# Patient Record
Sex: FEMALE | Race: BLACK OR AFRICAN AMERICAN | NOT HISPANIC OR LATINO | Employment: FULL TIME | URBAN - METROPOLITAN AREA
[De-identification: names, ages, dates, MRNs, and addresses within clinical notes are randomized per-mention and may not be internally consistent; named-entity substitution may affect disease eponyms.]

---

## 2018-02-09 ENCOUNTER — HOSPITAL ENCOUNTER (EMERGENCY)
Facility: HOSPITAL | Age: 38
Discharge: HOME | End: 2018-02-09
Attending: EMERGENCY MEDICINE
Payer: MEDICAID

## 2018-02-09 VITALS
BODY MASS INDEX: 28 KG/M2 | OXYGEN SATURATION: 97 % | HEART RATE: 93 BPM | SYSTOLIC BLOOD PRESSURE: 115 MMHG | TEMPERATURE: 99 F | DIASTOLIC BLOOD PRESSURE: 69 MMHG | RESPIRATION RATE: 16 BRPM | HEIGHT: 64 IN | WEIGHT: 164 LBS

## 2018-02-09 DIAGNOSIS — R07.9 CHEST PAIN, UNSPECIFIED TYPE: Primary | ICD-10-CM

## 2018-02-09 LAB
ALBUMIN SERPL BCP-MCNC: 3.4 G/DL
ALP SERPL-CCNC: 65 U/L
ALT SERPL W/O P-5'-P-CCNC: 10 U/L
ANION GAP SERPL CALC-SCNC: 10 MMOL/L
AST SERPL-CCNC: 14 U/L
B-HCG UR QL: POSITIVE
BASOPHILS # BLD AUTO: 0.01 K/UL
BASOPHILS NFR BLD: 0.1 %
BILIRUB SERPL-MCNC: 0.2 MG/DL
BNP SERPL-MCNC: 14 PG/ML
BUN SERPL-MCNC: 7 MG/DL
CALCIUM SERPL-MCNC: 9.5 MG/DL
CHLORIDE SERPL-SCNC: 106 MMOL/L
CO2 SERPL-SCNC: 22 MMOL/L
CREAT SERPL-MCNC: 0.8 MG/DL
CTP QC/QA: YES
DIFFERENTIAL METHOD: ABNORMAL
EOSINOPHIL # BLD AUTO: 0.1 K/UL
EOSINOPHIL NFR BLD: 1.1 %
ERYTHROCYTE [DISTWIDTH] IN BLOOD BY AUTOMATED COUNT: 14.3 %
EST. GFR  (AFRICAN AMERICAN): >60 ML/MIN/1.73 M^2
EST. GFR  (NON AFRICAN AMERICAN): >60 ML/MIN/1.73 M^2
GLUCOSE SERPL-MCNC: 89 MG/DL
HCT VFR BLD AUTO: 35.2 %
HGB BLD-MCNC: 11.5 G/DL
INR PPP: 1
LYMPHOCYTES # BLD AUTO: 1.3 K/UL
LYMPHOCYTES NFR BLD: 18.1 %
MCH RBC QN AUTO: 26.2 PG
MCHC RBC AUTO-ENTMCNC: 32.7 G/DL
MCV RBC AUTO: 80 FL
MONOCYTES # BLD AUTO: 0.5 K/UL
MONOCYTES NFR BLD: 6.5 %
NEUTROPHILS # BLD AUTO: 5.4 K/UL
NEUTROPHILS NFR BLD: 73.9 %
PLATELET # BLD AUTO: 315 K/UL
PMV BLD AUTO: 10.1 FL
POTASSIUM SERPL-SCNC: 3.4 MMOL/L
PROT SERPL-MCNC: 8.1 G/DL
PROTHROMBIN TIME: 10 SEC
RBC # BLD AUTO: 4.39 M/UL
SODIUM SERPL-SCNC: 138 MMOL/L
TROPONIN I SERPL DL<=0.01 NG/ML-MCNC: <0.006 NG/ML
WBC # BLD AUTO: 7.35 K/UL

## 2018-02-09 PROCEDURE — 93005 ELECTROCARDIOGRAM TRACING: CPT

## 2018-02-09 PROCEDURE — 83880 ASSAY OF NATRIURETIC PEPTIDE: CPT

## 2018-02-09 PROCEDURE — 25000003 PHARM REV CODE 250: Performed by: NURSE PRACTITIONER

## 2018-02-09 PROCEDURE — 63600175 PHARM REV CODE 636 W HCPCS: Performed by: NURSE PRACTITIONER

## 2018-02-09 PROCEDURE — 85025 COMPLETE CBC W/AUTO DIFF WBC: CPT

## 2018-02-09 PROCEDURE — 81025 URINE PREGNANCY TEST: CPT | Performed by: EMERGENCY MEDICINE

## 2018-02-09 PROCEDURE — 80053 COMPREHEN METABOLIC PANEL: CPT

## 2018-02-09 PROCEDURE — 84484 ASSAY OF TROPONIN QUANT: CPT

## 2018-02-09 PROCEDURE — 85610 PROTHROMBIN TIME: CPT

## 2018-02-09 PROCEDURE — 99284 EMERGENCY DEPT VISIT MOD MDM: CPT | Mod: 25

## 2018-02-09 PROCEDURE — 93010 ELECTROCARDIOGRAM REPORT: CPT | Mod: ,,, | Performed by: INTERNAL MEDICINE

## 2018-02-09 PROCEDURE — 96372 THER/PROPH/DIAG INJ SC/IM: CPT

## 2018-02-09 RX ORDER — NAPROXEN 500 MG/1
500 TABLET ORAL 2 TIMES DAILY WITH MEALS
Qty: 28 TABLET | Refills: 0 | Status: SHIPPED | OUTPATIENT
Start: 2018-02-09 | End: 2018-02-23

## 2018-02-09 RX ORDER — METHOCARBAMOL 500 MG/1
1000 TABLET, FILM COATED ORAL 3 TIMES DAILY
Qty: 20 TABLET | Refills: 0 | Status: SHIPPED | OUTPATIENT
Start: 2018-02-09 | End: 2018-02-14

## 2018-02-09 RX ORDER — HYDROXYZINE PAMOATE 25 MG/1
50 CAPSULE ORAL
Status: COMPLETED | OUTPATIENT
Start: 2018-02-09 | End: 2018-02-09

## 2018-02-09 RX ORDER — KETOROLAC TROMETHAMINE 30 MG/ML
15 INJECTION, SOLUTION INTRAMUSCULAR; INTRAVENOUS
Status: COMPLETED | OUTPATIENT
Start: 2018-02-09 | End: 2018-02-09

## 2018-02-09 RX ADMIN — KETOROLAC TROMETHAMINE 15 MG: 30 INJECTION, SOLUTION INTRAMUSCULAR at 04:02

## 2018-02-09 RX ADMIN — HYDROXYZINE PAMOATE 50 MG: 25 CAPSULE ORAL at 04:02

## 2018-02-09 NOTE — ED TRIAGE NOTES
Pt states that she felt a R chest pain that came from between shoulder blades. Pt states she went to work and the pain became worse. Pt states that the pain is an intermit stabbing pain. Pt states SOB that began today. Pt states she has been taking theraflu x 2 days.

## 2018-02-09 NOTE — DISCHARGE INSTRUCTIONS
Please return to the ED for any new or worsening symptoms: worsening chest pain, shortness of breath, loss of consciousness or any other concerns. Please follow up with primary care within in the week. You may also call [phone] for the [facility] same day appointment line.

## 2018-02-09 NOTE — ED PROVIDER NOTES
"Encounter Date: 2018    SCRIBE #1 NOTE: I, Gabriela Jason, am scribing for, and in the presence of,  YAHIR Fitzgerald. I have scribed the following portions of the note - Other sections scribed: HPI and ROS.       History     Chief Complaint   Patient presents with    Hyperventilating    Chest Pain     mid-sternal CP since waking this AM     CC: Chest Pain    HPI: This 38 y.o female presents to the ED c/o acute, intermittent, 10/10 "striking" right pectoral chest pain radiating to her right upper back that began this morning.  Patient reports the pain is exacerbated with movement and with deep inspiration.  Patient denies fever, shortness of breath, cough, abdominal pain, rhinorrhea, rash, or any other associated symptoms.  Patient reports taking Theraflu 1 week ago after having flu-like symptoms.  Patient reports undergoing termination of pregnancy 1 week ago and reports she is scheduled to follow up next week (5 days from today).  No prior tx.  No alleviating factors.      The history is provided by the patient. No  was used.     Review of patient's allergies indicates:  No Known Allergies  History reviewed. No pertinent past medical history.  Past Surgical History:   Procedure Laterality Date     SECTION       History reviewed. No pertinent family history.  Social History   Substance Use Topics    Smoking status: Never Smoker    Smokeless tobacco: Never Used    Alcohol use No     Review of Systems   Constitutional: Negative for chills and fever.   HENT: Negative for ear pain and sore throat.    Eyes: Negative for pain.   Respiratory: Negative for cough and shortness of breath.    Cardiovascular: Positive for chest pain.   Gastrointestinal: Negative for abdominal pain, diarrhea, nausea and vomiting.   Genitourinary: Negative for dysuria.   Musculoskeletal: Positive for back pain.   Skin: Negative for rash.   Neurological: Negative for weakness, numbness and headaches. "       Physical Exam     Initial Vitals [02/09/18 1359]   BP Pulse Resp Temp SpO2   132/88 (!) 118 (!) 56 98.7 °F (37.1 °C) 99 %      MAP       102.67         Physical Exam    Constitutional: Vital signs are normal. She appears well-developed and well-nourished.  Non-toxic appearance.   Eyes: EOM are normal.   Neck: Full passive range of motion without pain. Neck supple. No neck rigidity.   Cardiovascular: Normal rate, S1 normal, S2 normal and normal heart sounds. Exam reveals no gallop.    No murmur heard.  Pulmonary/Chest: Effort normal and breath sounds normal. No tachypnea. She has no decreased breath sounds. She has no wheezes. She has no rhonchi. She has no rales. She exhibits no tenderness.   Abdominal: Soft. Normal appearance. There is no tenderness. There is no CVA tenderness, no tenderness at McBurney's point and negative Mclaughlin's sign.   Musculoskeletal:        Cervical back: Normal.        Thoracic back: Normal. She exhibits no tenderness and no bony tenderness.   Neurological: She is alert and oriented to person, place, and time. She has normal strength. Gait normal. GCS eye subscore is 4. GCS verbal subscore is 5. GCS motor subscore is 6.   Skin: Skin is warm and dry. No rash noted.   Psychiatric: Her speech is normal. Thought content normal. Her mood appears anxious. She is agitated.         ED Course   Procedures  Labs Reviewed   CBC W/ AUTO DIFFERENTIAL - Abnormal; Notable for the following:        Result Value    Hemoglobin 11.5 (*)     Hematocrit 35.2 (*)     MCV 80 (*)     MCH 26.2 (*)     Gran% 73.9 (*)     All other components within normal limits   COMPREHENSIVE METABOLIC PANEL - Abnormal; Notable for the following:     Potassium 3.4 (*)     CO2 22 (*)     Albumin 3.4 (*)     All other components within normal limits   POCT URINE PREGNANCY - Abnormal; Notable for the following:     POC Preg Test, Ur Positive (*)     All other components within normal limits   B-TYPE NATRIURETIC PEPTIDE    TROPONIN I   PROTIME-INR     EKG Readings: (Independently Interpreted)   Initial Reading: No STEMI. Rhythm: Sinus Tachycardia. Heart Rate: 104. Ectopy: No Ectopy. Conduction: Normal. ST Segments: Normal ST Segments. T Waves: Normal. Clinical Impression: Normal Sinus Rhythm          Medical Decision Making:   ED Management:  This is a 38-year-old nonpregnant female who presents the ED with complaints of chest pain that is worse with deep breathing and movement.  Patient states the [facility] center and causes her to hyperventilate.  She is afebrile and well-appearing.  Her UPT is positive however, patient recently underwent elective .  She denies abdominal pain, vaginal bleeding.  Twelve-lead ECG in triage shows sinus tachycardia.  On my evaluation, her heart rate is normal.  Laboratory evaluation including troponin negative.  Chest x-ray negative.  After receiving Toradol and hydroxyzine, the patient states her pain is resolving.  As her pain is reproducible, I suspect musculoskeletal pain.  No evidence to suggest PE, pneumonia, ACS or other serious etiology.  Discharged home with prescriptions for naproxen and Robaxin.  Instructions given for supportive care and follow-up.  Return precautions given.  Case discussed with Dr. Schroeder, who agrees her plan.            Scribe Attestation:   Scribe #1: I performed the above scribed service and the documentation accurately describes the services I performed. I attest to the accuracy of the note.    Attending Attestation:           Physician Attestation for Scribe:  Physician Attestation Statement for Scribe #1: I, YAHIR Fitzgerald, reviewed documentation, as scribed by Gabriela Jason in my presence, and it is both accurate and complete.                 ED Course      Clinical Impression:   The encounter diagnosis was Chest pain, unspecified type.    Disposition:   Disposition: Discharged  Condition: Stable                        Sumaya Luna  NP  02/09/18 4319

## 2019-02-04 ENCOUNTER — HOSPITAL ENCOUNTER (EMERGENCY)
Facility: OTHER | Age: 39
Discharge: HOME | End: 2019-02-04
Attending: EMERGENCY MEDICINE
Payer: MEDICAID

## 2019-02-04 VITALS
OXYGEN SATURATION: 99 % | WEIGHT: 154 LBS | DIASTOLIC BLOOD PRESSURE: 80 MMHG | BODY MASS INDEX: 26.29 KG/M2 | HEIGHT: 64 IN | SYSTOLIC BLOOD PRESSURE: 133 MMHG | RESPIRATION RATE: 18 BRPM | HEART RATE: 100 BPM | TEMPERATURE: 99 F

## 2019-02-04 DIAGNOSIS — M54.6 ACUTE BILATERAL THORACIC BACK PAIN: Primary | ICD-10-CM

## 2019-02-04 LAB
B-HCG UR QL: NEGATIVE
CTP QC/QA: YES

## 2019-02-04 PROCEDURE — 81025 URINE PREGNANCY TEST: CPT | Performed by: EMERGENCY MEDICINE

## 2019-02-04 PROCEDURE — 99284 EMERGENCY DEPT VISIT MOD MDM: CPT | Mod: 25

## 2019-02-04 PROCEDURE — 25000003 PHARM REV CODE 250: Performed by: EMERGENCY MEDICINE

## 2019-02-04 PROCEDURE — 63600175 PHARM REV CODE 636 W HCPCS: Performed by: EMERGENCY MEDICINE

## 2019-02-04 PROCEDURE — 96372 THER/PROPH/DIAG INJ SC/IM: CPT

## 2019-02-04 RX ORDER — ORPHENADRINE CITRATE 30 MG/ML
30 INJECTION INTRAMUSCULAR; INTRAVENOUS
Status: COMPLETED | OUTPATIENT
Start: 2019-02-04 | End: 2019-02-04

## 2019-02-04 RX ORDER — IBUPROFEN 600 MG/1
600 TABLET ORAL EVERY 6 HOURS PRN
Qty: 20 TABLET | Refills: 0 | OUTPATIENT
Start: 2019-02-04 | End: 2023-03-30

## 2019-02-04 RX ORDER — ORPHENADRINE CITRATE 100 MG/1
100 TABLET, EXTENDED RELEASE ORAL 2 TIMES DAILY
Qty: 20 TABLET | Refills: 0 | Status: SHIPPED | OUTPATIENT
Start: 2019-02-04 | End: 2019-02-14

## 2019-02-04 RX ORDER — KETOROLAC TROMETHAMINE 30 MG/ML
30 INJECTION, SOLUTION INTRAMUSCULAR; INTRAVENOUS
Status: COMPLETED | OUTPATIENT
Start: 2019-02-04 | End: 2019-02-04

## 2019-02-04 RX ORDER — HYDROCODONE BITARTRATE AND ACETAMINOPHEN 5; 325 MG/1; MG/1
2 TABLET ORAL
Status: COMPLETED | OUTPATIENT
Start: 2019-02-04 | End: 2019-02-04

## 2019-02-04 RX ADMIN — ORPHENADRINE CITRATE 30 MG: 30 INJECTION INTRAMUSCULAR; INTRAVENOUS at 08:02

## 2019-02-04 RX ADMIN — KETOROLAC TROMETHAMINE 30 MG: 30 INJECTION, SOLUTION INTRAMUSCULAR; INTRAVENOUS at 08:02

## 2019-02-04 RX ADMIN — HYDROCODONE BITARTRATE AND ACETAMINOPHEN 2 TABLET: 5; 325 TABLET ORAL at 08:02

## 2019-02-05 NOTE — ED NOTES
Appearance: Pt awake, alert & oriented to person, place & time. Pt in no acute distress at present time. Pt is clean and well groomed with clothes appropriately fastened.   Skin: Skin warm, dry & intact. Color consistent with ethnicity. Mucous membranes moist. No breakdown or brusing noted.   Musculoskeletal: Patient moving all extremities well, no obvious swelling or deformities noted. SEE HPI.   Respiratory: Respirations spontaneous, even, and non-labored. Visible chest rise noted. Airway is open and patent. No accessory muscle use noted.   Neurologic: Sensation is intact. Speech is clear and appropriate. Eyes open spontaneously, behavior appropriate to situation, follows commands,  purposeful motor response noted.   Cardiac:  No Bilateral lower extremity edema. Cap refill is <3 seconds. Pt denies active chest pains, SOB, dizziness, blurred vision, weakness or fatigue at this time.   Abdomen: Pt denies active abd pains, cramping or discomfort, No N/V/D at this time.   : Pt reports no dysuria or hematuria.

## 2019-02-05 NOTE — ED PROVIDER NOTES
Encounter Date: 2019       History     Chief Complaint   Patient presents with    Back Pain     Pt CO upper back pain after heavy lifting at work.      39-year-old female presents with a sudden onset of back pain that occurred approximately 2-3 hours prior to arrival.  She describes as aching cramping.  Occurred when she bent over to  a heavy bucket and felt a sudden pain. Worse with movement and better with rest.  Denies any numbness or tingling.  Denies any loss of bowel or bladder.          Review of patient's allergies indicates:  No Known Allergies  History reviewed. No pertinent past medical history.  Past Surgical History:   Procedure Laterality Date     SECTION       History reviewed. No pertinent family history.  Social History     Tobacco Use    Smoking status: Never Smoker    Smokeless tobacco: Never Used   Substance Use Topics    Alcohol use: No    Drug use: No     Review of Systems   Constitutional: Negative for activity change and appetite change.   HENT: Negative for rhinorrhea.    Respiratory: Negative for shortness of breath.    Cardiovascular: Negative for chest pain.   Musculoskeletal: Positive for back pain.       Physical Exam     Initial Vitals [19 1914]   BP Pulse Resp Temp SpO2   133/80 100 18 98.5 °F (36.9 °C) 99 %      MAP       --         Physical Exam    Nursing note and vitals reviewed.  Constitutional: Vital signs are normal. She appears well-developed and well-nourished. She does not have a sickly appearance.   HENT:   Head: Normocephalic and atraumatic.   Eyes: Conjunctivae and EOM are normal.   Neck: Normal range of motion.   Musculoskeletal:   Patient has tenderness to palpation to the bilateral paraspinal muscles of the lower thoracic region.  There is no midline tenderness to palpation, step-offs or deformities of the cervical thoracic or lumbar spine.   Neurological: She is alert and oriented to person, place, and time.   Skin: Skin is warm, dry and  intact. Capillary refill takes less than 2 seconds.         ED Course   Procedures  Labs Reviewed   POCT URINE PREGNANCY          Imaging Results    None          Medical Decision Making:   ED Management:  The patient's back pain is likely a musculoskeletal strain.  There are no signs of saddle anesthesia, incontinence, neurologic deficits, fevers, trauma or midline tenderness on history or physical to suggest cauda equina, infectious process, fracture or subluxation.  I will treat with pain medication, anti-inflammatories and muscle relaxers for relief.    This is the extent to the patients complaints today here in the emergency department.                      Clinical Impression:     1. Acute bilateral thoracic back pain                                  Kip Solano DO  02/04/19 2054

## 2019-02-05 NOTE — ED NOTES
"Pt to ED, reporting posterior thoracic midline back pain with onset x several hours ago. Reporting she was "picking up a bucket of water about to mop and felt intense pain in my back." denies hearing snap or pop with intense pain, denies bowel or bladder incontinence. Ambulates with steady gait. Pt AAOx4 and appropriate at this time. Respirations even and unlabored. No acute distress noted.    "

## 2023-03-29 ENCOUNTER — HOSPITAL ENCOUNTER (EMERGENCY)
Facility: OTHER | Age: 43
Discharge: HOME | End: 2023-03-30
Attending: EMERGENCY MEDICINE
Payer: COMMERCIAL

## 2023-03-29 VITALS
TEMPERATURE: 99 F | OXYGEN SATURATION: 98 % | RESPIRATION RATE: 16 BRPM | HEIGHT: 65 IN | BODY MASS INDEX: 23.32 KG/M2 | DIASTOLIC BLOOD PRESSURE: 89 MMHG | SYSTOLIC BLOOD PRESSURE: 137 MMHG | WEIGHT: 140 LBS | HEART RATE: 93 BPM

## 2023-03-29 DIAGNOSIS — N83.201 RIGHT OVARIAN CYST: ICD-10-CM

## 2023-03-29 DIAGNOSIS — R60.1 GENERALIZED EDEMA: ICD-10-CM

## 2023-03-29 DIAGNOSIS — R10.31 RIGHT LOWER QUADRANT ABDOMINAL PAIN: ICD-10-CM

## 2023-03-29 DIAGNOSIS — N04.9 NEPHROTIC SYNDROME: Primary | ICD-10-CM

## 2023-03-29 LAB
ALBUMIN SERPL BCP-MCNC: 1.1 G/DL (ref 3.5–5.2)
ALP SERPL-CCNC: 50 U/L (ref 55–135)
ALT SERPL W/O P-5'-P-CCNC: 15 U/L (ref 10–44)
ANION GAP SERPL CALC-SCNC: 7 MMOL/L (ref 8–16)
AST SERPL-CCNC: 16 U/L (ref 10–40)
B-HCG UR QL: NEGATIVE
BACTERIA #/AREA URNS HPF: NORMAL /HPF
BASOPHILS # BLD AUTO: 0.03 K/UL (ref 0–0.2)
BASOPHILS NFR BLD: 0.6 % (ref 0–1.9)
BILIRUB SERPL-MCNC: 0.1 MG/DL (ref 0.1–1)
BILIRUB UR QL STRIP: NEGATIVE
BNP SERPL-MCNC: 27 PG/ML (ref 0–99)
BUN SERPL-MCNC: 12 MG/DL (ref 6–20)
CALCIUM SERPL-MCNC: 8.1 MG/DL (ref 8.7–10.5)
CHLORIDE SERPL-SCNC: 111 MMOL/L (ref 95–110)
CLARITY UR: CLEAR
CO2 SERPL-SCNC: 22 MMOL/L (ref 23–29)
COLOR UR: YELLOW
CREAT SERPL-MCNC: 0.6 MG/DL (ref 0.5–1.4)
CTP QC/QA: YES
DIFFERENTIAL METHOD: ABNORMAL
EOSINOPHIL # BLD AUTO: 0.2 K/UL (ref 0–0.5)
EOSINOPHIL NFR BLD: 3.1 % (ref 0–8)
ERYTHROCYTE [DISTWIDTH] IN BLOOD BY AUTOMATED COUNT: 15.1 % (ref 11.5–14.5)
EST. GFR  (NO RACE VARIABLE): >60 ML/MIN/1.73 M^2
GLUCOSE SERPL-MCNC: 83 MG/DL (ref 70–110)
GLUCOSE UR QL STRIP: NEGATIVE
HCT VFR BLD AUTO: 44.1 % (ref 37–48.5)
HGB BLD-MCNC: 13.7 G/DL (ref 12–16)
HGB UR QL STRIP: ABNORMAL
HYALINE CASTS #/AREA URNS LPF: 1 /LPF
IMM GRANULOCYTES # BLD AUTO: 0.01 K/UL (ref 0–0.04)
IMM GRANULOCYTES NFR BLD AUTO: 0.2 % (ref 0–0.5)
KETONES UR QL STRIP: NEGATIVE
LEUKOCYTE ESTERASE UR QL STRIP: NEGATIVE
LIPASE SERPL-CCNC: 42 U/L (ref 4–60)
LYMPHOCYTES # BLD AUTO: 2.2 K/UL (ref 1–4.8)
LYMPHOCYTES NFR BLD: 42.7 % (ref 18–48)
MCH RBC QN AUTO: 25.7 PG (ref 27–31)
MCHC RBC AUTO-ENTMCNC: 31.1 G/DL (ref 32–36)
MCV RBC AUTO: 83 FL (ref 82–98)
MICROSCOPIC COMMENT: NORMAL
MONOCYTES # BLD AUTO: 0.3 K/UL (ref 0.3–1)
MONOCYTES NFR BLD: 5.9 % (ref 4–15)
NEUTROPHILS # BLD AUTO: 2.5 K/UL (ref 1.8–7.7)
NEUTROPHILS NFR BLD: 47.5 % (ref 38–73)
NITRITE UR QL STRIP: NEGATIVE
NRBC BLD-RTO: 0 /100 WBC
PH UR STRIP: 7 [PH] (ref 5–8)
PLATELET # BLD AUTO: 311 K/UL (ref 150–450)
PMV BLD AUTO: 10.7 FL (ref 9.2–12.9)
POTASSIUM SERPL-SCNC: 3.6 MMOL/L (ref 3.5–5.1)
PROT SERPL-MCNC: 5.4 G/DL (ref 6–8.4)
PROT UR QL STRIP: ABNORMAL
RBC # BLD AUTO: 5.33 M/UL (ref 4–5.4)
RBC #/AREA URNS HPF: 4 /HPF (ref 0–4)
SODIUM SERPL-SCNC: 140 MMOL/L (ref 136–145)
SP GR UR STRIP: 1.03 (ref 1–1.03)
SQUAMOUS #/AREA URNS HPF: 15 /HPF
URN SPEC COLLECT METH UR: ABNORMAL
UROBILINOGEN UR STRIP-ACNC: NEGATIVE EU/DL
WBC # BLD AUTO: 5.22 K/UL (ref 3.9–12.7)
WBC #/AREA URNS HPF: 3 /HPF (ref 0–5)

## 2023-03-29 PROCEDURE — 81000 URINALYSIS NONAUTO W/SCOPE: CPT | Performed by: EMERGENCY MEDICINE

## 2023-03-29 PROCEDURE — 81025 URINE PREGNANCY TEST: CPT | Performed by: EMERGENCY MEDICINE

## 2023-03-29 PROCEDURE — 83880 ASSAY OF NATRIURETIC PEPTIDE: CPT | Performed by: EMERGENCY MEDICINE

## 2023-03-29 PROCEDURE — 99284 EMERGENCY DEPT VISIT MOD MDM: CPT | Mod: 25

## 2023-03-29 PROCEDURE — 85025 COMPLETE CBC W/AUTO DIFF WBC: CPT | Performed by: EMERGENCY MEDICINE

## 2023-03-29 PROCEDURE — 80053 COMPREHEN METABOLIC PANEL: CPT | Performed by: EMERGENCY MEDICINE

## 2023-03-29 PROCEDURE — 83690 ASSAY OF LIPASE: CPT | Performed by: EMERGENCY MEDICINE

## 2023-03-30 ENCOUNTER — TELEPHONE (OUTPATIENT)
Dept: EMERGENCY MEDICINE | Facility: OTHER | Age: 43
End: 2023-03-30

## 2023-03-30 DIAGNOSIS — N04.9 NEPHROTIC SYNDROME: Primary | ICD-10-CM

## 2023-03-30 NOTE — PLAN OF CARE
Sw fax referral to [facility] for Patient.    [facility]  [phone]

## 2023-03-30 NOTE — DISCHARGE INSTRUCTIONS
Drink plenty of fluids.  Wear compression stockings.  Do not take any aspirin, ibuprofen, naproxen, or NSAIDs until you are cleared by Nephrology.  A referral to Nephrology was placed,  will call you within the next 2 days to schedule or you can call the clinic.  Return to the ER any time for new or worsening symptoms.

## 2023-03-30 NOTE — ED TRIAGE NOTES
Patient to ED with c/o bilateral feet swelling that started Monday . Also reports skin irritation to Right lower pubic area

## 2023-03-30 NOTE — ED PROVIDER NOTES
Encounter Date: 3/29/2023    SCRIBE #1 NOTE: I, Nga Sandyhens, am scribing for, and in the presence of,  Erum Martin MD.     History     Chief Complaint   Patient presents with    Joint Swelling     Patient to ED with c/o bilateral feet swelling that started Monday . Also reports skin irritation to Right lower pubic area.      This is a 43 y.o. female who presents with complaint of bilateral foot and ankle swelling which she noticed 2 days ago. She states that she does a lot of walking for work, however, the swelling is not her normal baseline. She also reports right sided lower abdomen tenderness onset yesterday, with associated generalized irritation to the right supapubic area. She notes chronic urinary frequency. She denies any abnormal vaginal bleeding or discharge. She denies fever, nausea, vomiting, diarrhea, SOB, or chest pain. This is the extent of the patient's complaints at this time.        The history is provided by the patient.   Review of patient's allergies indicates:  No Known Allergies  History reviewed. No pertinent past medical history.  Past Surgical History:   Procedure Laterality Date     SECTION       History reviewed. No pertinent family history.  Social History     Tobacco Use    Smoking status: Never    Smokeless tobacco: Never   Substance Use Topics    Alcohol use: No    Drug use: No     Review of Systems   Constitutional:  Negative for chills and fever.   HENT:  Negative for congestion and sore throat.    Eyes:  Negative for visual disturbance.   Respiratory:  Negative for cough and shortness of breath.    Cardiovascular:  Positive for leg swelling. Negative for chest pain and palpitations.   Gastrointestinal:  Positive for abdominal pain. Negative for diarrhea and vomiting.   Genitourinary:  Positive for urgency. Negative for decreased urine volume, dysuria, vaginal bleeding and vaginal discharge.   Musculoskeletal:  Negative for neck pain and neck stiffness.   Skin:   Negative for rash and wound.   Neurological:  Negative for weakness, numbness and headaches.   Psychiatric/Behavioral:  Negative for confusion.      Physical Exam     Initial Vitals [03/29/23 1946]   BP Pulse Resp Temp SpO2   137/89 93 16 99 °F (37.2 °C) 98 %      MAP       --         Physical Exam    Constitutional: She appears well-developed and well-nourished. She is cooperative.   HENT:   Head: Normocephalic and atraumatic.   Mouth/Throat: Uvula is midline, oropharynx is clear and moist and mucous membranes are normal. No oropharyngeal exudate.   Eyes: Conjunctivae and EOM are normal. Pupils are equal, round, and reactive to light.   Neck: Neck supple.   Cardiovascular:  Normal rate, regular rhythm and normal heart sounds.           Pulmonary/Chest: Breath sounds normal. No respiratory distress. She has no wheezes. She has no rales.   Abdominal: Abdomen is soft. There is abdominal tenderness.   Lower right quadrant tenderness. There is no rebound and no guarding.   Musculoskeletal:      Cervical back: Neck supple.     Neurological: She is alert and oriented to person, place, and time. She has normal strength. No cranial nerve deficit or sensory deficit. GCS score is 15. GCS eye subscore is 4. GCS verbal subscore is 5. GCS motor subscore is 6.   Skin: Skin is warm and dry. Capillary refill takes less than 2 seconds. No rash noted.   Psychiatric: She has a normal mood and affect. Her speech is normal and behavior is normal. Thought content normal.       ED Course   Procedures  Labs Reviewed   CBC W/ AUTO DIFFERENTIAL - Abnormal; Notable for the following components:       Result Value    MCH 25.7 (*)     MCHC 31.1 (*)     RDW 15.1 (*)     All other components within normal limits   COMPREHENSIVE METABOLIC PANEL - Abnormal; Notable for the following components:    Chloride 111 (*)     CO2 22 (*)     Calcium 8.1 (*)     Total Protein 5.4 (*)     Albumin 1.1 (*)     Alkaline Phosphatase 50 (*)     Anion Gap 7 (*)      All other components within normal limits   URINALYSIS, REFLEX TO URINE CULTURE - Abnormal; Notable for the following components:    Protein, UA 3+ (*)     Occult Blood UA 1+ (*)     All other components within normal limits    Narrative:     Specimen Source->Urine   B-TYPE NATRIURETIC PEPTIDE   LIPASE   URINALYSIS MICROSCOPIC    Narrative:     Specimen Source->Urine   POCT URINE PREGNANCY          Imaging Results               CT Abdomen Pelvis  Without Contrast (Final result)  Result time 03/30/23 00:06:09      Final result by Seth Walsh MD (03/30/23 00:06:09)                   Impression:      Mild accentuation of the body wall fat planes, correlation for general body edema is needed.    Minimal pleural effusion bilaterally, right greater than left, minimal pericardial thickening or fluid noted.    Mild perinephric stranding bilaterally without obstructive uropathy, nonspecific, may relate to a general pattern of body edema however correlation for UTI/pyelonephritis is needed.    Mild accentuated attenuation of the left renal collecting structures, may relate to proteinuria or hematuria, clinical and historical correlation and urinalysis is recommended.    Prominent appearance of the region of the lower uterine segment/cervix for which clinical and historical correlation and evaluation is recommended.    Suspected 2.2 cm right adnexal follicle/cyst.    Additional findings as above.    This report was flagged in Epic as abnormal.      Electronically signed by: Seth Walsh  Date:    03/30/2023  Time:    00:06               Narrative:    EXAMINATION:  CT ABDOMEN PELVIS WITHOUT CONTRAST    CLINICAL HISTORY:  RLQ abdominal pain (Age >= 14y);    TECHNIQUE:  Low dose axial images, sagittal and coronal reformations were obtained from the lung bases to the pubic symphysis.  Intravenous contrast and oral contrast was not utilized, this diminishes the sensitivity of the  examination.    COMPARISON:  None    FINDINGS:  There is appearance of mild accentuated attenuation of the body wall fat planes particularly inferiorly along the lower abdomen and pelvis, this is nonspecific although may relate to general body edema for which clinical and historical correlation is needed.    The lung bases demonstrate mild atelectatic change.  There are bilateral minimal pleural effusions, right greater than left.  Minimal pericardial thickening or fluid noted as well.    The stomach demonstrates nonspecific appearance of mild ingested material and air within the lumen.  When accounting for limitations of the examination, there is no evidence for acute process of the liver, gallbladder, pancreas, spleen, or adrenal glands.    There is mild perinephric haziness bilaterally, this is nonspecific, and may relate to a pattern of general body edema, however correlation for UTI/pyelonephritis is needed.  There is no evidence for ureteral calculus, or obstructive uropathy bilaterally.  An extrarenal pelvis is noted on the left.  There is mild accentuated attenuation of the left extrarenal pelvis, this may relate to proteinuria or hematuria, clinical and historical correlation, correlation with urinalysis is recommended.    The abdominal aorta appears normal in caliber otherwise not well evaluated on this noncontrast examination.  The urinary bladder appears unremarkable for degree of distention.  There is prominent heterogeneous appearance of the region of the lower uterine segment/cervix for which clinical and historical correlation and evaluation is recommended.  Evaluation of the uterus and adnexa is limited.  There does appear to be a 2.2 cm follicle/cyst at the right adnexa measuring 13 Hounsfield units.  There is mild free fluid in the lower pelvis at the cul-de-sac.  This is nonspecific.    There is a small fat-density umbilical hernia without bowel involvement.  There is no evidence for small bowel  obstructive process.  The appendix is identified, it does not appear inflamed.  There is no evidence for inflammatory or obstructive process of the colon.  There is no evidence for free intraperitoneal air.    The visualized osseous structures appear intact.  Mild chronic change noted.                                       Medications - No data to display  Medical Decision Making:   History:   Old Medical Records: I decided to obtain old medical records.  Clinical Tests:   Lab Tests: Ordered and Reviewed  Medical Tests: Ordered and Reviewed  ED Management:  Emergent evaluation a 43-year-old female who presents with complaint of new onset bilateral ankle and foot swelling, also right lower quadrant tenderness x1 day.  Vital signs are benign, afebrile.  On exam there is point tenderness in the right lower quadrant without rebound or guarding.  There is 1+ edema of the foot and ankle, no calf tenderness and I doubt bilateral DVT.  Patient admits that she was at a crawfish boil on Sunday, I was suspicious for salt load causing edema.  However, workup reveals evidence of nephrotic syndrome.  All labs are reviewed by me, she had 3+ proteinuria on urinalysis, albumin level of 1.1, normal BUN and creatinine.  CBC showed no leukocytosis or anemia, BNP is negative for heart failure, lipase is negative for pancreatitis, pregnancy test is negative for pregnancy.  Workup for right lower quadrant pain included CT scan which shows a normal appendix but a right-sided ovarian cyst which is likely cause of her pain.  Incidentally, CT scan also makes note of diffuse body wall edema consistent with anasarca, as well as small bilateral pleural effusions.  I discussed the case with Dr. Giang with Nephrology, patient will need biopsy of her kidney to determine etiology for new onset nephrotic syndrome.  We planned for admission to the observation unit for biopsy in the morning, but patient admits that she took a leave 3 days ago for  tooth pain, and nephrologist states they can not perform a biopsy with NSAID use within the last week.  Will arrange for outpatient workup.  Referral is placed for Nephrology and Internal Medicine, message is sent to Medicaid navigator for assistance with scheduling.  Patient is discharged in good condition and strict return precautions for worsening.        Scribe Attestation:   Scribe #1: I performed the above scribed service and the documentation accurately describes the services I performed. I attest to the accuracy of the note.            Physician Attestation for Scribe: I, Erum Martin, reviewed documentation as scribed in my presence, which is both accurate and complete.         Clinical Impression:   Final diagnoses:  [N04.9] Nephrotic syndrome (Primary)  [R60.1] Generalized edema  [R10.31] Right lower quadrant abdominal pain  [N83.201] Right ovarian cyst        ED Disposition Condition    Discharge Stable          ED Prescriptions    None       Follow-up Information       Follow up With Specialties Details Why Contact Info    Your regular primary care doctor  Schedule an appointment as soon as possible for a visit  For symptom recheck and close follow-up     Amrita Giang MD Nephrology  or the nephrologist of your choice [facility] [facility] [address]  [facility] [address]  [phone]      [facility] - Emergency Dept Emergency Medicine  As needed, If symptoms worsen [address] [facility] [address] [phone]             Erum Martin MD  03/30/23 0138

## 2023-03-30 NOTE — ED TRIAGE NOTES
Lilia Vaca, a 43 y.o. female presents to the ED w/ complaint of generalized swelling of body and tightness to legs.    Triage note:  Chief Complaint   Patient presents with    Joint Swelling     Patient to ED with c/o bilateral feet swelling that started Monday . Also reports skin irritation to Right lower pubic area.      Review of patient's allergies indicates:  No Known Allergies  History reviewed. No pertinent past medical history.

## 2023-04-06 ENCOUNTER — HOSPITAL ENCOUNTER (OUTPATIENT)
Facility: OTHER | Age: 43
Discharge: HOME | End: 2023-04-07
Attending: EMERGENCY MEDICINE | Admitting: EMERGENCY MEDICINE
Payer: COMMERCIAL

## 2023-04-06 DIAGNOSIS — N04.9 NEPHROTIC SYNDROME: Primary | ICD-10-CM

## 2023-04-06 PROCEDURE — 81025 URINE PREGNANCY TEST: CPT | Performed by: EMERGENCY MEDICINE

## 2023-04-06 NOTE — Clinical Note
Diagnosis: Nephrotic syndrome [561732]   Future Attending Provider: JENNIFER OWUSU [1945]   Is the patient being sent to ED Observation?: Yes   Admitting Provider:: JENNIFER OWUSU [[id number]]   Special Needs:: No Special Needs [1]

## 2023-04-07 VITALS
OXYGEN SATURATION: 99 % | RESPIRATION RATE: 17 BRPM | BODY MASS INDEX: 29.43 KG/M2 | DIASTOLIC BLOOD PRESSURE: 82 MMHG | TEMPERATURE: 98 F | HEART RATE: 98 BPM | SYSTOLIC BLOOD PRESSURE: 114 MMHG | HEIGHT: 64 IN | WEIGHT: 172.38 LBS

## 2023-04-07 DIAGNOSIS — N04.9 NEPHROTIC SYNDROME: Primary | ICD-10-CM

## 2023-04-07 LAB
25(OH)D3+25(OH)D2 SERPL-MCNC: <4 NG/ML (ref 30–96)
ALBUMIN SERPL BCP-MCNC: 0.7 G/DL (ref 3.5–5.2)
ALP SERPL-CCNC: 53 U/L (ref 55–135)
ALT SERPL W/O P-5'-P-CCNC: 9 U/L (ref 10–44)
ANION GAP SERPL CALC-SCNC: 8 MMOL/L (ref 8–16)
AST SERPL-CCNC: 13 U/L (ref 10–40)
B-HCG UR QL: NEGATIVE
BACTERIA #/AREA URNS HPF: ABNORMAL /HPF
BASOPHILS # BLD AUTO: 0.03 K/UL (ref 0–0.2)
BASOPHILS NFR BLD: 0.4 % (ref 0–1.9)
BILIRUB SERPL-MCNC: 0.1 MG/DL (ref 0.1–1)
BILIRUB UR QL STRIP: NEGATIVE
BILIRUB UR QL STRIP: NEGATIVE
BNP SERPL-MCNC: 21 PG/ML (ref 0–99)
BUN SERPL-MCNC: 11 MG/DL (ref 6–20)
C3 SERPL-MCNC: 138 MG/DL (ref 50–180)
C4 SERPL-MCNC: 31 MG/DL (ref 11–44)
CALCIUM SERPL-MCNC: 7.7 MG/DL (ref 8.7–10.5)
CHLORIDE SERPL-SCNC: 108 MMOL/L (ref 95–110)
CHOLEST SERPL-MCNC: 459 MG/DL (ref 120–199)
CHOLEST/HDLC SERPL: 6.6 {RATIO} (ref 2–5)
CLARITY UR: ABNORMAL
CLARITY UR: ABNORMAL
CO2 SERPL-SCNC: 20 MMOL/L (ref 23–29)
COLOR UR: YELLOW
COLOR UR: YELLOW
CREAT SERPL-MCNC: 0.8 MG/DL (ref 0.5–1.4)
CREAT UR-MCNC: 337.1 MG/DL (ref 15–325)
CTP QC/QA: YES
DIFFERENTIAL METHOD: ABNORMAL
EOSINOPHIL # BLD AUTO: 0.1 K/UL (ref 0–0.5)
EOSINOPHIL NFR BLD: 1.8 % (ref 0–8)
ERYTHROCYTE [DISTWIDTH] IN BLOOD BY AUTOMATED COUNT: 14.9 % (ref 11.5–14.5)
EST. GFR  (NO RACE VARIABLE): >60 ML/MIN/1.73 M^2
GLUCOSE SERPL-MCNC: 88 MG/DL (ref 70–110)
GLUCOSE UR QL STRIP: ABNORMAL
GLUCOSE UR QL STRIP: ABNORMAL
HAV IGM SERPL QL IA: NORMAL
HBV CORE IGM SERPL QL IA: NORMAL
HBV SURFACE AG SERPL QL IA: NORMAL
HCT VFR BLD AUTO: 42.8 % (ref 37–48.5)
HCV AB SERPL QL IA: NEGATIVE
HCV AB SERPL QL IA: NORMAL
HDLC SERPL-MCNC: 70 MG/DL (ref 40–75)
HDLC SERPL: 15.3 % (ref 20–50)
HGB BLD-MCNC: 13.4 G/DL (ref 12–16)
HGB UR QL STRIP: ABNORMAL
HGB UR QL STRIP: ABNORMAL
HIV 1+2 AB+HIV1 P24 AG SERPL QL IA: NEGATIVE
HYALINE CASTS #/AREA URNS LPF: 28 /LPF
IMM GRANULOCYTES # BLD AUTO: 0.01 K/UL (ref 0–0.04)
IMM GRANULOCYTES NFR BLD AUTO: 0.1 % (ref 0–0.5)
KETONES UR QL STRIP: NEGATIVE
KETONES UR QL STRIP: NEGATIVE
LDLC SERPL CALC-MCNC: 359.8 MG/DL (ref 63–159)
LEUKOCYTE ESTERASE UR QL STRIP: NEGATIVE
LEUKOCYTE ESTERASE UR QL STRIP: NEGATIVE
LYMPHOCYTES # BLD AUTO: 2 K/UL (ref 1–4.8)
LYMPHOCYTES NFR BLD: 29.3 % (ref 18–48)
MCH RBC QN AUTO: 26.2 PG (ref 27–31)
MCHC RBC AUTO-ENTMCNC: 31.3 G/DL (ref 32–36)
MCV RBC AUTO: 84 FL (ref 82–98)
MICROSCOPIC COMMENT: ABNORMAL
MONOCYTES # BLD AUTO: 0.4 K/UL (ref 0.3–1)
MONOCYTES NFR BLD: 6.3 % (ref 4–15)
NEUTROPHILS # BLD AUTO: 4.2 K/UL (ref 1.8–7.7)
NEUTROPHILS NFR BLD: 62.1 % (ref 38–73)
NITRITE UR QL STRIP: NEGATIVE
NITRITE UR QL STRIP: NEGATIVE
NON-SQ EPI CELLS #/AREA URNS HPF: 2 /HPF
NONHDLC SERPL-MCNC: 389 MG/DL
NRBC BLD-RTO: 0 /100 WBC
PH UR STRIP: 7 [PH] (ref 5–8)
PH UR STRIP: 7 [PH] (ref 5–8)
PLATELET # BLD AUTO: 354 K/UL (ref 150–450)
PMV BLD AUTO: 10.8 FL (ref 9.2–12.9)
POTASSIUM SERPL-SCNC: 3.5 MMOL/L (ref 3.5–5.1)
PROT SERPL-MCNC: 5.1 G/DL (ref 6–8.4)
PROT UR QL STRIP: ABNORMAL
PROT UR QL STRIP: ABNORMAL
PROT UR-MCNC: >1500 MG/DL (ref 0–15)
PROT/CREAT UR: ABNORMAL MG/G{CREAT} (ref 0–0.2)
PTH-INTACT SERPL-MCNC: 119.4 PG/ML (ref 9–77)
RBC # BLD AUTO: 5.12 M/UL (ref 4–5.4)
RBC #/AREA URNS HPF: 10 /HPF (ref 0–4)
RHEUMATOID FACT SERPL-ACNC: <13 IU/ML (ref 0–15)
RPR SER QL: NORMAL
SODIUM SERPL-SCNC: 136 MMOL/L (ref 136–145)
SP GR UR STRIP: >1.03 (ref 1–1.03)
SP GR UR STRIP: >1.03 (ref 1–1.03)
SQUAMOUS #/AREA URNS HPF: 22 /HPF
T4 FREE SERPL-MCNC: 0.6 NG/DL (ref 0.71–1.51)
TRIGL SERPL-MCNC: 146 MG/DL (ref 30–150)
TROPONIN I SERPL DL<=0.01 NG/ML-MCNC: <0.006 NG/ML (ref 0–0.03)
TSH SERPL DL<=0.005 MIU/L-ACNC: 4.81 UIU/ML (ref 0.4–4)
URATE SERPL-MCNC: 4.2 MG/DL (ref 2.4–5.7)
URN SPEC COLLECT METH UR: ABNORMAL
URN SPEC COLLECT METH UR: ABNORMAL
UROBILINOGEN UR STRIP-ACNC: NEGATIVE EU/DL
UROBILINOGEN UR STRIP-ACNC: NEGATIVE EU/DL
WBC # BLD AUTO: 6.83 K/UL (ref 3.9–12.7)
WBC #/AREA URNS HPF: 15 /HPF (ref 0–5)

## 2023-04-07 PROCEDURE — 82172 ASSAY OF APOLIPOPROTEIN: CPT | Performed by: NURSE PRACTITIONER

## 2023-04-07 PROCEDURE — 96360 HYDRATION IV INFUSION INIT: CPT

## 2023-04-07 PROCEDURE — 84165 PROTEIN E-PHORESIS SERUM: CPT | Mod: 26,,, | Performed by: PATHOLOGY

## 2023-04-07 PROCEDURE — G0378 HOSPITAL OBSERVATION PER HR: HCPCS

## 2023-04-07 PROCEDURE — 86255 FLUORESCENT ANTIBODY SCREEN: CPT | Performed by: NURSE PRACTITIONER

## 2023-04-07 PROCEDURE — 86334 PATHOLOGIST INTERPRETATION IFE: ICD-10-PCS | Mod: 26,,, | Performed by: PATHOLOGY

## 2023-04-07 PROCEDURE — 84165 PATHOLOGIST INTERPRETATION SPE: ICD-10-PCS | Mod: 26,,, | Performed by: PATHOLOGY

## 2023-04-07 PROCEDURE — 86334 IMMUNOFIX E-PHORESIS SERUM: CPT | Performed by: NURSE PRACTITIONER

## 2023-04-07 PROCEDURE — 85025 COMPLETE CBC W/AUTO DIFF WBC: CPT | Performed by: PHYSICIAN ASSISTANT

## 2023-04-07 PROCEDURE — 84550 ASSAY OF BLOOD/URIC ACID: CPT | Performed by: NURSE PRACTITIONER

## 2023-04-07 PROCEDURE — 84156 ASSAY OF PROTEIN URINE: CPT | Mod: 91 | Performed by: NURSE PRACTITIONER

## 2023-04-07 PROCEDURE — 86038 ANTINUCLEAR ANTIBODIES: CPT | Performed by: NURSE PRACTITIONER

## 2023-04-07 PROCEDURE — 86160 COMPLEMENT ANTIGEN: CPT | Mod: 59 | Performed by: NURSE PRACTITIONER

## 2023-04-07 PROCEDURE — 86431 RHEUMATOID FACTOR QUANT: CPT | Performed by: NURSE PRACTITIONER

## 2023-04-07 PROCEDURE — 84156 ASSAY OF PROTEIN URINE: CPT | Performed by: NURSE PRACTITIONER

## 2023-04-07 PROCEDURE — 83970 ASSAY OF PARATHORMONE: CPT | Performed by: NURSE PRACTITIONER

## 2023-04-07 PROCEDURE — 83521 IG LIGHT CHAINS FREE EACH: CPT | Mod: 59 | Performed by: NURSE PRACTITIONER

## 2023-04-07 PROCEDURE — 86803 HEPATITIS C AB TEST: CPT | Performed by: EMERGENCY MEDICINE

## 2023-04-07 PROCEDURE — 25000003 PHARM REV CODE 250: Performed by: EMERGENCY MEDICINE

## 2023-04-07 PROCEDURE — 82306 VITAMIN D 25 HYDROXY: CPT | Performed by: NURSE PRACTITIONER

## 2023-04-07 PROCEDURE — 86060 ANTISTREPTOLYSIN O TITER: CPT | Performed by: NURSE PRACTITIONER

## 2023-04-07 PROCEDURE — 83520 IMMUNOASSAY QUANT NOS NONAB: CPT | Performed by: NURSE PRACTITIONER

## 2023-04-07 PROCEDURE — 81000 URINALYSIS NONAUTO W/SCOPE: CPT | Performed by: PHYSICIAN ASSISTANT

## 2023-04-07 PROCEDURE — 86334 IMMUNOFIX E-PHORESIS SERUM: CPT | Mod: 26,,, | Performed by: PATHOLOGY

## 2023-04-07 PROCEDURE — 80074 ACUTE HEPATITIS PANEL: CPT | Performed by: NURSE PRACTITIONER

## 2023-04-07 PROCEDURE — 84484 ASSAY OF TROPONIN QUANT: CPT | Performed by: PHYSICIAN ASSISTANT

## 2023-04-07 PROCEDURE — 86160 COMPLEMENT ANTIGEN: CPT | Performed by: NURSE PRACTITIONER

## 2023-04-07 PROCEDURE — 83880 ASSAY OF NATRIURETIC PEPTIDE: CPT | Performed by: PHYSICIAN ASSISTANT

## 2023-04-07 PROCEDURE — 84439 ASSAY OF FREE THYROXINE: CPT | Performed by: EMERGENCY MEDICINE

## 2023-04-07 PROCEDURE — 25000003 PHARM REV CODE 250: Performed by: NURSE PRACTITIONER

## 2023-04-07 PROCEDURE — 86592 SYPHILIS TEST NON-TREP QUAL: CPT | Performed by: NURSE PRACTITIONER

## 2023-04-07 PROCEDURE — 83520 IMMUNOASSAY QUANT NOS NONAB: CPT | Mod: 59 | Performed by: NURSE PRACTITIONER

## 2023-04-07 PROCEDURE — 80061 LIPID PANEL: CPT | Performed by: NURSE PRACTITIONER

## 2023-04-07 PROCEDURE — 87086 URINE CULTURE/COLONY COUNT: CPT | Performed by: PHYSICIAN ASSISTANT

## 2023-04-07 PROCEDURE — 86036 ANCA SCREEN EACH ANTIBODY: CPT | Mod: 59 | Performed by: NURSE PRACTITIONER

## 2023-04-07 PROCEDURE — 87389 HIV-1 AG W/HIV-1&-2 AB AG IA: CPT | Performed by: EMERGENCY MEDICINE

## 2023-04-07 PROCEDURE — 96361 HYDRATE IV INFUSION ADD-ON: CPT

## 2023-04-07 PROCEDURE — 86255 FLUORESCENT ANTIBODY SCREEN: CPT | Mod: 59 | Performed by: NURSE PRACTITIONER

## 2023-04-07 PROCEDURE — 36415 COLL VENOUS BLD VENIPUNCTURE: CPT | Performed by: NURSE PRACTITIONER

## 2023-04-07 PROCEDURE — 94761 N-INVAS EAR/PLS OXIMETRY MLT: CPT

## 2023-04-07 PROCEDURE — 99285 EMERGENCY DEPT VISIT HI MDM: CPT

## 2023-04-07 PROCEDURE — 84443 ASSAY THYROID STIM HORMONE: CPT | Performed by: EMERGENCY MEDICINE

## 2023-04-07 PROCEDURE — 80053 COMPREHEN METABOLIC PANEL: CPT | Performed by: PHYSICIAN ASSISTANT

## 2023-04-07 PROCEDURE — 84165 PROTEIN E-PHORESIS SERUM: CPT | Performed by: NURSE PRACTITIONER

## 2023-04-07 RX ORDER — ATORVASTATIN CALCIUM 10 MG/1
10 TABLET, FILM COATED ORAL DAILY
Qty: 30 TABLET | Refills: 0 | Status: SHIPPED | OUTPATIENT
Start: 2023-04-07 | End: 2023-04-14 | Stop reason: ALTCHOICE

## 2023-04-07 RX ORDER — ATORVASTATIN CALCIUM 10 MG/1
10 TABLET, FILM COATED ORAL NIGHTLY
Status: DISCONTINUED | OUTPATIENT
Start: 2023-04-07 | End: 2023-04-07 | Stop reason: HOSPADM

## 2023-04-07 RX ORDER — MORPHINE SULFATE 2 MG/ML
2 INJECTION, SOLUTION INTRAMUSCULAR; INTRAVENOUS EVERY 4 HOURS PRN
Status: DISCONTINUED | OUTPATIENT
Start: 2023-04-07 | End: 2023-04-07 | Stop reason: HOSPADM

## 2023-04-07 RX ORDER — TALC
6 POWDER (GRAM) TOPICAL NIGHTLY PRN
Status: DISCONTINUED | OUTPATIENT
Start: 2023-04-07 | End: 2023-04-07 | Stop reason: HOSPADM

## 2023-04-07 RX ORDER — LEVOTHYROXINE SODIUM 25 UG/1
25 TABLET ORAL
Status: DISCONTINUED | OUTPATIENT
Start: 2023-04-08 | End: 2023-04-07

## 2023-04-07 RX ORDER — SODIUM CHLORIDE 0.9 % (FLUSH) 0.9 %
10 SYRINGE (ML) INJECTION
Status: DISCONTINUED | OUTPATIENT
Start: 2023-04-07 | End: 2023-04-07 | Stop reason: HOSPADM

## 2023-04-07 RX ORDER — LEVOTHYROXINE SODIUM 25 UG/1
25 TABLET ORAL
Qty: 30 TABLET | Refills: 0 | Status: SHIPPED | OUTPATIENT
Start: 2023-04-07 | End: 2023-11-02

## 2023-04-07 RX ORDER — ONDANSETRON 2 MG/ML
4 INJECTION INTRAMUSCULAR; INTRAVENOUS EVERY 8 HOURS PRN
Status: DISCONTINUED | OUTPATIENT
Start: 2023-04-07 | End: 2023-04-07 | Stop reason: HOSPADM

## 2023-04-07 RX ORDER — SODIUM CHLORIDE 9 MG/ML
INJECTION, SOLUTION INTRAVENOUS CONTINUOUS
Status: DISCONTINUED | OUTPATIENT
Start: 2023-04-07 | End: 2023-04-07

## 2023-04-07 RX ORDER — LEVOTHYROXINE SODIUM 25 UG/1
25 TABLET ORAL
Status: DISCONTINUED | OUTPATIENT
Start: 2023-04-07 | End: 2023-04-07 | Stop reason: HOSPADM

## 2023-04-07 RX ADMIN — LEVOTHYROXINE SODIUM 25 MCG: 25 TABLET ORAL at 08:04

## 2023-04-07 RX ADMIN — SODIUM CHLORIDE: 9 INJECTION, SOLUTION INTRAVENOUS at 02:04

## 2023-04-07 NOTE — ED NOTES
Resumed pt care. 43 YOF presents to ED with c/o BLE swelling. Pending Nephrology Consult. +2 BLE edemas noted. +1 bilateral pedal pulses noted. Denies any PMH. A&Ox4. Denies any other complaints.     LOC: The patient is awake, alert and aware of environment with an appropriate affect, the patient is oriented x 3.  APPEARANCE: Patient resting comfortably and in no acute distress, patient is clean and well groomed, patient's clothing is properly fastened.  SKIN: The skin is warm and dry, patient has normal skin turgor and moist mucus membranes, skin intact, no breakdown or brusing noted.  MUSKULOSKELETAL: Patient moving all extremities well, no obvious swelling or deformities noted.  RESPIRATORY: Airway is open and patent, respirations are spontaneous, patient has a normal effort and rate.  CARDIAC: No peripheral edema.  ABDOMEN: Soft and no tenderness to palpation, no distention noted.     ED workup in progress. VSS. Safety measures in place; side rails up x2. Call light within pt reach. Will continue to monitor.

## 2023-04-07 NOTE — CONSULTS
"Consult Note  Nephrology    Consult Requested By: No att. providers found  Reason for Consult: Nephrotic Syn    SUBJECTIVE:     History of Present Illness:  Patient is a 43 y.o. female presents with progressive lower extremity swelling over last few weeks.  Presented to [facility] Emergency room about a week ago with same complaints and was directed to follow-up with [facility] as an outpatient for kidney biopsy and evaluation as she was currently taking NSAIDs.  Presented to [facility] emergency room and left without being seen yesterday.  Came here for another evaluation and admitted for observation.  No quantified urine protein but epic documentation reveals 3+ over multiple encounters and hypoalbuminemia.    Consulted for assistance.  Patient seen and examined.  For reasons unknown to me she was on normal saline at 125 cc an hour.  No pertinent medical history and only claims that her father had some type of kidney disease.  Acknowledges foamy urine for "a while at home".  3 para 3.  Does not take any medicines except for occasional NSAIDs for generalized pains.  Admits to high salt intake.  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  Explained plan of care including urine and laboratory testing.  Unable to perform biopsy on good Friday and will try to arrange next week with follow-up.  Patient and team agreed to this plan.  See below.    Epic reviewed.    No CP/SOB/N/V/D/F/C.    History reviewed. No pertinent past medical history.  Past Surgical History:   Procedure Laterality Date     SECTION       History reviewed. No pertinent family history.  Social History     Tobacco Use    Smoking status: Never    Smokeless tobacco: Never   Substance Use Topics    Alcohol use: No    Drug use: No       Review of patient's allergies indicates:  No Known Allergies     Review of Systems:  Constitutional: No fever or chills  Respiratory: No cough or shortness of breath  Cardiovascular: No chest pain or " palpitations  Gastrointestinal: No nausea or vomiting  Neurological: No confusion or weakness    OBJECTIVE:     Vital Signs (Most Recent)  Temp: 98 °F (36.7 °C) (04/07/23 0815)  Pulse: 90 (04/07/23 0815)  Resp: 18 (04/07/23 0815)  BP: 126/80 (04/07/23 0815)  SpO2: 98 % (04/07/23 0815)    Vital Signs Range (Last 24H):  Temp:  [98 °F (36.7 °C)-98.7 °F (37.1 °C)]   Pulse:  []   Resp:  [16-20]   BP: (114-136)/(73-84)   SpO2:  [97 %-100 %]     No intake or output data in the 24 hours ending 04/07/23 0817    Physical Exam:  General appearance: Well developed, well nourished  Eyes:  Conjunctivae/corneas clear. PERRL.  Lungs: Normal respiratory effort,   clear to auscultation bilaterally   Heart: Regular rate and rhythm, S1, S2 normal, no murmur, rub or alli.  Abdomen: Soft, non-tender non-distended; bowel sounds normal; no masses,  no organomegaly  Extremities: No cyanosis or clubbing. 2+ edema.    Skin: Skin color, texture, turgor normal. No rashes or lesions  Neurologic: Normal strength and tone. No focal numbness or weakness       Laboratory:  Recent Labs   Lab 04/07/23  0002   WBC 6.83   RBC 5.12   HGB 13.4   HCT 42.8      MCV 84   MCH 26.2*   MCHC 31.3*     BMP:   Recent Labs   Lab 04/07/23  0002   GLU 88      K 3.5      CO2 20*   BUN 11   CREATININE 0.8   CALCIUM 7.7*     Lab Results   Component Value Date    CALCIUM 7.7 (L) 04/07/2023     BNP  Recent Labs   Lab 04/07/23  0002   BNP 21   No results found for: URICACIDNo results found for: IRON, TIBC, FERRITIN, SATURATEDIRO  Lab Results   Component Value Date    CALCIUM 7.7 (L) 04/07/2023       Diagnostic Results:  No orders to display       ASSESSMENT/PLAN:     Suspected nephrotic syndrome with 3+ proteinuria and severe hypoalbuminemia:  -no significant medical or family history  -will obtain urine and nephrotic workup  -unable to perform biopsy on holiday and will try to arrange as outpatient next week with follow-up  -Needs statin, ASA  (after biopsy), antiprotenuric meds.    -DC IVF's, advised against NSAIDs.   -ok to DC after labs obtained.   -no HTN  -Mild hypothyroidism and would start low dose synthroid  -corrected Calcium 10.3 so would not correct or give Vit D yet.   -discussed low salt diet.   -see orders.  -Renally dose meds, avoid nephrotoxins, and monitor I/O's closely.        Thanks for consult  See above  DC after labs drawn.  I have arranged outpt Biopsy for next week.

## 2023-04-07 NOTE — FIRST PROVIDER EVALUATION
Emergency Department TeleTriage Encounter Note      CHIEF COMPLAINT    Chief Complaint   Patient presents with    Leg Swelling     Bilateral feet, lower legs and ankle swelling for over a week. Was seen here a week ago for the same thing and swelling has not gotten any better       VITAL SIGNS   Initial Vitals [04/06/23 2205]   BP Pulse Resp Temp SpO2   128/78 98 20 98.5 °F (36.9 °C) 99 %      MAP       --            ALLERGIES    Review of patient's allergies indicates:  No Known Allergies    PROVIDER TRIAGE NOTE  43-year-old female presents with lower extremity edema. Seen 1 week ago with the same complaint and diagnosed with nephrotic syndrome. Vitals are normal. No distress noted.       ORDERS  Labs Reviewed   HIV 1 / 2 ANTIBODY   HEPATITIS C ANTIBODY       ED Orders (720h ago, onward)      Start Ordered     Status Ordering Provider    04/06/23 2209 04/06/23 2208  HIV 1/2 Ag/Ab (4th Gen)  STAT         Ordered LEXX CEE    04/06/23 2209 04/06/23 2208  Hepatitis C Antibody  STAT         Ordered LEXX CEE    Unscheduled 04/06/23 2232  Saline lock IV  Once         Ordered DEVON PENA    Unscheduled 04/06/23 2232  CBC Auto Differential  STAT         Ordered DEVON PENA    Unscheduled 04/06/23 2232  Comprehensive Metabolic Panel  STAT         Ordered DEVON PENA    Unscheduled 04/06/23 2232  BNP  STAT         Ordered DEVON PENA    Unscheduled 04/06/23 2232  Urinalysis, Reflex to Urine Culture  STAT         Ordered DEVON PENA    Unscheduled 04/06/23 2232  Troponin I  STAT         Ordered DEVON PENA              Virtual Visit Note: The provider triage portion of this emergency department evaluation and documentation was performed via [employer], a HIPAA-compliant telemedicine application, in concert with a tele-presenter in the room. A face to face patient evaluation with one of my colleagues will occur once the patient is placed in an emergency department  room.      DISCLAIMER: This note was prepared with [employer] voice recognition transcription software. Garbled syntax, mangled pronouns, and other bizarre constructions may be attributed to that software system.

## 2023-04-07 NOTE — ED TRIAGE NOTES
Pt reports BLE edema x1 week, [facility] for same complaint. Pt states the BLE has not improved since last visit. Pt noted to have 2+ edema to BLE, 2+ DP bilaterally. Pt AAOx4, VSS, NADN. Call light next to pt, bed in lowest/locked position, side rails up x2, pt free from fall/injury. Labs pending, pt aware urine sample is needed. Pt instructed to ask for assistance, verbalizes understanding. Will continue pt's plan of care while pt remains in the ER.

## 2023-04-07 NOTE — CARE UPDATE
04/07/23 0400   Patient Assessment/Suction   Level of Consciousness (AVPU) alert   Respiratory Effort Normal;Unlabored   All Lung Fields Breath Sounds equal bilaterally;clear   Rhythm/Pattern, Respiratory no shortness of breath reported   PRE-TX-O2   Device (Oxygen Therapy) room air   SpO2 98 %   Pulse Oximetry Type Intermittent   $ Pulse Oximetry - Multiple Charge Pulse Oximetry - Multiple   Pulse 91   Resp 18

## 2023-04-07 NOTE — DISCHARGE SUMMARY
[facility] ED Observation Unit  Discharge Summary        History of Present Illness:    Lilia Vaca is a 43 y.o. female presenting with bilateral lower extremity and abdominal swelling worsening for the past several days. Patient was seen in this ED with similar complaints of abdomina pain and swelling on 3/29/23 and discharged with instructions to return should her symptoms arise again. She denies any SOB.       ED Course:  Labs largely unchanged from 1 week ago.  Was unable to establish follow-up from previous ER visit.  Was admitted to the EDOU for for allergy consult in the morning     Observation Course:    Patient was evaluated by Nephrology who recommended patient is starting on a statin and Synthroid.  Nephrology clinical reach out to schedule patient for her biopsy.    Brief Physical Exam/Reassessment   Review of Systems   Constitutional:  Negative for chills and fever.   Respiratory:  Negative for shortness of breath.    Cardiovascular:  Positive for leg swelling. Negative for chest pain.   Gastrointestinal:  Negative for abdominal pain, nausea and vomiting.        Abdominal swelling   Neurological:  Negative for dizziness, weakness and headaches.   All other systems reviewed and are negative.    Physical Exam    Nursing note and vitals reviewed.  Constitutional: Vital signs are normal. She appears well-developed and well-nourished. She does not appear ill. No distress.   Neck: Neck supple.   Cardiovascular:  Normal rate, regular rhythm, S1 normal, S2 normal and normal heart sounds.           Pulses:       Dorsalis pedis pulses are 2+ on the right side and 2+ on the left side.   Pulmonary/Chest: Effort normal and breath sounds normal. No tachypnea. She has no decreased breath sounds.   Abdominal: Abdomen is flat. Bowel sounds are normal.   Lower abdominal swelling.   Musculoskeletal:      Cervical back: Neck supple.      Comments: Plus two pitting edema     Neurological: She is alert and oriented to  person, place, and time. GCS eye subscore is 4. GCS verbal subscore is 5. GCS motor subscore is 6.   Skin: Skin is warm, dry and intact.   Psychiatric: She has a normal mood and affect. Her behavior is normal.       Consultants:    Nephrology    ED/OBS Workup:  Vitals:    04/07/23 0217 04/07/23 0400 04/07/23 0611 04/07/23 0815   BP: 136/76  114/79 126/80   Pulse: 93 91 93 90   Resp: 18 18 16 18   Temp: 98.5 °F (36.9 °C)  98.7 °F (37.1 °C) 98 °F (36.7 °C)   TempSrc:   Oral Oral   SpO2: 97% 98% 97% 98%   Weight:       Height:        04/07/23 0957   BP: 114/82   Pulse: 98   Resp: 17   Temp: 98 °F (36.7 °C)   TempSrc: Oral   SpO2: 99%   Weight:    Height:        Labs Reviewed   CBC W/ AUTO DIFFERENTIAL - Abnormal; Notable for the following components:       Result Value    MCH 26.2 (*)     MCHC 31.3 (*)     RDW 14.9 (*)     All other components within normal limits   COMPREHENSIVE METABOLIC PANEL - Abnormal; Notable for the following components:    CO2 20 (*)     Calcium 7.7 (*)     Total Protein 5.1 (*)     Albumin 0.7 (*)     Alkaline Phosphatase 53 (*)     ALT 9 (*)     All other components within normal limits   URINALYSIS, REFLEX TO URINE CULTURE - Abnormal; Notable for the following components:    Appearance, UA Hazy (*)     Specific Gravity, UA >1.030 (*)     Protein, UA 3+ (*)     Glucose, UA Trace (*)     Occult Blood UA 3+ (*)     All other components within normal limits    Narrative:     Specimen Source->Urine  Collection Start Date->4/7/23  Collection Start Time-> 9:45 AM  Collection Stop Time-> 9:46 AM    24hr urine and UPE received in error 4-7-2023, at time of UAR & UPRCR   random urine submission.  24hr urine and UPE order to be kept open   until 24hr collection is submitted in lab. [facility]   URINALYSIS, REFLEX TO URINE CULTURE - Abnormal; Notable for the following components:    Appearance, UA Hazy (*)     Specific Gravity, UA >1.030 (*)     Protein, UA 3+ (*)     Glucose, UA Trace (*)     Occult Blood UA  3+ (*)     All other components within normal limits    Narrative:     Specimen Source->Urine  Collection Start Date->4/7/23  Collection Start Time-> 9:45 AM  Collection Stop Time-> 9:46 AM    24hr urine and UPE received in error 4-7-2023, at time of UAR & UPRCR   random urine submission.  24hr urine and UPE order to be kept open   until 24hr collection is submitted in lab. [facility]   TSH - Abnormal; Notable for the following components:    TSH 4.807 (*)     All other components within normal limits   T4, FREE - Abnormal; Notable for the following components:    Free T4 0.60 (*)     All other components within normal limits   PTH, INTACT - Abnormal; Notable for the following components:    PTH, Intact 119.4 (*)     All other components within normal limits   PROTEIN / CREATININE RATIO, URINE - Abnormal; Notable for the following components:    Creatinine, Urine 337.1 (*)     All other components within normal limits    Narrative:     Specimen Source->Urine  Collection Start Date->4/7/23  Collection Start Time-> 9:45 AM  Collection Stop Time-> 9:46 AM    24hr urine and UPE received in error 4-7-2023, at time of UAR & UPRCR   random urine submission.  24hr urine and UPE order to be kept open   until 24hr collection is submitted in lab. [facility]   URINALYSIS MICROSCOPIC - Abnormal; Notable for the following components:    RBC, UA 10 (*)     WBC, UA 15 (*)     Bacteria Moderate (*)     Non-Squam Epith 2 (*)     Hyaline Casts, UA 28 (*)     All other components within normal limits    Narrative:     Specimen Source->Urine  Collection Start Date->4/7/23  Collection Start Time-> 9:45 AM  Collection Stop Time-> 9:46 AM    24hr urine and UPE received in error 4-7-2023, at time of UAR & UPRCR   random urine submission.  24hr urine and UPE order to be kept open   until 24hr collection is submitted in lab. [facility]   POCT URINE PREGNANCY - Normal   CULTURE, URINE   HIV 1 / 2 ANTIBODY    Narrative:     Release to patient->Immediate   HEPATITIS  C ANTIBODY    Narrative:     Release to patient->Immediate   B-TYPE NATRIURETIC PEPTIDE   TROPONIN I   URIC ACID   URIC ACID   PROTEIN, URINE, TIMED    Narrative:     Specimen Source->Urine  Collection Start Date->4/7/23  Collection Start Time-> 9:45 AM  Collection Stop Time-> 9:46 AM    24hr urine and UPE received in error 4-7-2023, at time of UAR & UPRCR   random urine submission.  24hr urine and UPE order to be kept open   until 24hr collection is submitted in lab. [facility]   PÉREZ PROFILE I (SCREEN) W/ REFLEX   ANTI-NEUTROPHILIC CYTOPLASMIC ANTIBODY   RPR   STREPTOCOCCAL ANTIBODIES (ASO)   C3 COMPLEMENT   C4 COMPLEMENT   PROTEIN ELECTROPHORESIS, SERUM   HEPATITIS PANEL, ACUTE   IMMUNOGLOBULIN FREE LT CHAINS BLOOD   GLOMERULAR BASEMENT MEMBRANE ANTIBODIES   APOLIPOPROTEIN A1 AND B   PHOSPHOLIPASE A2 RECEPTOR AB, SERUM   VITAMIN D   LIPID PANEL   RHEUMATOID FACTOR   PROTEIN ELECTROPHORESIS, TIMED URINE       No orders to display       Final Diagnosis:  1. Nephrotic syndrome        Plan:  Discharge home with close follow-up with Nephrology, the clinical reach out to schedule patient for her biopsy.  Will start patient on statins and Synthroid.  Strict return precautions given to the patient including worsening of symptoms, shortness breath, chest pain, fever she is return to emerge department for re-evaluation.  She stated understanding.    Discharge Condition: Good    Disposition: Home or Self Care     Time spent on the discharge of the patient including review of hospital course with the patient. reviewing discharge medications and arranging follow-up care 35 minutes.  Patient was seen and examined on the date of discharge and determined to be suitable for discharge.    Follow Up:   ED Disposition Condition    Discharge Stable            ED Prescriptions       Medication Sig Dispense Start Date End Date Auth. Provider    atorvastatin (LIPITOR) 10 MG tablet Take 1 tablet (10 mg total) by mouth once daily. 30 tablet  4/7/2023 5/7/2023 JOSE LUIS Balbuena    levothyroxine (SYNTHROID) 25 MCG tablet Take 1 tablet (25 mcg total) by mouth before breakfast. 30 tablet 4/7/2023 5/7/2023 JOSE LUIS Balbuena          Follow-up Information       Follow up With Specialties Details Why Contact Info    [facility] - Emergency Dept Emergency Medicine Go to  If symptoms worsen [address] [facility] [id number] [phone]    Clinton Hull NP Nephrology Call   [address] [address]  [phone]              Future Appointments   Date Time Provider Department Center   4/14/2023  9:40 AM Odalis Hamilton MD [facility]   4/25/2023  1:15 PM Nayana Aiken MD [facility]

## 2023-04-07 NOTE — ED NOTES
Pt awake in bed at this time, TALITA. Call light next to pt, bed in lowest/locked position, side rails up x2, pt free from fall/injury. Pt instructed to ask for assistance, verbalizes understanding. Will continue pt's plan of care while pt remains in the ER.

## 2023-04-07 NOTE — H&P
[facility] ED Observation Unit  History and Physical      I assumed care of this patient from the Emergency Department at onset of my shift at 9:00 a.m. on 2023.       History of Present Illness:  Lilia Vaca is a 43 y.o. female presenting with bilateral lower extremity and abdominal swelling worsening for the past several days. Patient was seen in this ED with similar complaints of abdomina pain and swelling on 3/29/23 and discharged with instructions to return should her symptoms arise again. She denies any SOB.      ED Course:  Labs largely unchanged from 1 week ago.  Was unable to establish follow-up from previous ER visit.  Was admitted to the EDOU for for allergy consult in the morning    I reviewed the ED Provider Note dated 2023 prior to my evaluation of this patient.  I reviewed all labs and imaging performed in the Main ED, prior to patient being placed in Observation. Patient was placed in the ED Observation Unit for nephrotic syndrome.    PMHx   History reviewed. No pertinent past medical history.   Past Surgical History:   Procedure Laterality Date     SECTION          Family Hx   History reviewed. No pertinent family history.     Social Hx   Social History     Socioeconomic History    Marital status: Single   Tobacco Use    Smoking status: Never    Smokeless tobacco: Never   Substance and Sexual Activity    Alcohol use: No    Drug use: No    Sexual activity: Yes     Partners: Male        Vital Signs   Vitals:    23 0217 23 0400 23 0611 23 0815   BP: 136/76  114/79 126/80   Pulse: 93 91 93 90   Resp: 18 18 16 18   Temp: 98.5 °F (36.9 °C)  98.7 °F (37.1 °C) 98 °F (36.7 °C)   TempSrc:   Oral Oral   SpO2: 97% 98% 97% 98%   Weight:       Height:        23 0957   BP: 114/82   Pulse: 98   Resp: 17   Temp: 98 °F (36.7 °C)   TempSrc: Oral   SpO2: 99%   Weight:    Height:        Review of Systems  Review of Systems   Constitutional:  Negative for chills,  fever and malaise/fatigue.   Respiratory:  Negative for shortness of breath.    Cardiovascular:  Positive for leg swelling. Negative for chest pain.   Gastrointestinal:  Negative for abdominal pain, nausea and vomiting.        Abdominal swelling   Skin:  Negative for rash.   Neurological:  Negative for dizziness and headaches.   All other systems reviewed and are negative.    Brief Physical Exam/Reassessment   Physical Exam    Nursing note and vitals reviewed.  Constitutional: Vital signs are normal. She appears well-developed and well-nourished. She does not appear ill. No distress.   Neck: Neck supple.   Cardiovascular:  Normal rate, regular rhythm, S1 normal, S2 normal and normal heart sounds.           Pulmonary/Chest: Effort normal and breath sounds normal. No tachypnea. She has no decreased breath sounds. She has no wheezes.   Abdominal: Abdomen is soft and flat. There is no abdominal tenderness.   Lower abdominal swelling   Musculoskeletal:      Cervical back: Neck supple.      Comments: Plus one pitting edema from lower abdomen to ankles     Neurological: She is alert and oriented to person, place, and time. GCS eye subscore is 4. GCS verbal subscore is 5. GCS motor subscore is 6.   Skin: Skin is warm, dry and intact.   Psychiatric: She has a normal mood and affect. Her behavior is normal.       Labs Reviewed   CBC W/ AUTO DIFFERENTIAL - Abnormal; Notable for the following components:       Result Value    MCH 26.2 (*)     MCHC 31.3 (*)     RDW 14.9 (*)     All other components within normal limits   COMPREHENSIVE METABOLIC PANEL - Abnormal; Notable for the following components:    CO2 20 (*)     Calcium 7.7 (*)     Total Protein 5.1 (*)     Albumin 0.7 (*)     Alkaline Phosphatase 53 (*)     ALT 9 (*)     All other components within normal limits   URINALYSIS, REFLEX TO URINE CULTURE - Abnormal; Notable for the following components:    Appearance, UA Hazy (*)     Specific Gravity, UA >1.030 (*)     Protein,  UA 3+ (*)     Glucose, UA Trace (*)     Occult Blood UA 3+ (*)     All other components within normal limits    Narrative:     Specimen Source->Urine  Collection Start Date->4/7/23  Collection Start Time-> 9:45 AM  Collection Stop Time-> 9:46 AM    24hr urine and UPE received in error 4-7-2023, at time of UAR & UPRCR   random urine submission.  24hr urine and UPE order to be kept open   until 24hr collection is submitted in lab. [facility]   URINALYSIS, REFLEX TO URINE CULTURE - Abnormal; Notable for the following components:    Appearance, UA Hazy (*)     Specific Gravity, UA >1.030 (*)     Protein, UA 3+ (*)     Glucose, UA Trace (*)     Occult Blood UA 3+ (*)     All other components within normal limits    Narrative:     Specimen Source->Urine  Collection Start Date->4/7/23  Collection Start Time-> 9:45 AM  Collection Stop Time-> 9:46 AM    24hr urine and UPE received in error 4-7-2023, at time of UAR & UPRCR   random urine submission.  24hr urine and UPE order to be kept open   until 24hr collection is submitted in lab. [facility]   TSH - Abnormal; Notable for the following components:    TSH 4.807 (*)     All other components within normal limits   T4, FREE - Abnormal; Notable for the following components:    Free T4 0.60 (*)     All other components within normal limits   PTH, INTACT - Abnormal; Notable for the following components:    PTH, Intact 119.4 (*)     All other components within normal limits   POCT URINE PREGNANCY - Normal   HIV 1 / 2 ANTIBODY    Narrative:     Release to patient->Immediate   HEPATITIS C ANTIBODY    Narrative:     Release to patient->Immediate   B-TYPE NATRIURETIC PEPTIDE   TROPONIN I   URIC ACID   URIC ACID   PROTEIN, URINE, TIMED    Narrative:     Specimen Source->Urine  Collection Start Date->4/7/23  Collection Start Time-> 9:45 AM  Collection Stop Time-> 9:46 AM    24hr urine and UPE received in error 4-7-2023, at time of UAR & UPRCR   random urine submission.  24hr urine and UPE order to be  kept open   until 24hr collection is submitted in lab. [facility]   URINALYSIS MICROSCOPIC    Narrative:     Specimen Source->Urine  Collection Start Date->4/7/23  Collection Start Time-> 9:45 AM  Collection Stop Time-> 9:46 AM    24hr urine and UPE received in error 4-7-2023, at time of UAR & UPRCR   random urine submission.  24hr urine and UPE order to be kept open   until 24hr collection is submitted in lab. [facility]   PÉREZ PROFILE I (SCREEN) W/ REFLEX   ANTI-NEUTROPHILIC CYTOPLASMIC ANTIBODY   RPR   STREPTOCOCCAL ANTIBODIES (ASO)   C3 COMPLEMENT   C4 COMPLEMENT   PROTEIN ELECTROPHORESIS, SERUM   HEPATITIS PANEL, ACUTE   IMMUNOGLOBULIN FREE LT CHAINS BLOOD   GLOMERULAR BASEMENT MEMBRANE ANTIBODIES   APOLIPOPROTEIN A1 AND B   PHOSPHOLIPASE A2 RECEPTOR AB, SERUM   VITAMIN D   PROTEIN / CREATININE RATIO, URINE   LIPID PANEL   RHEUMATOID FACTOR   PROTEIN ELECTROPHORESIS, TIMED URINE     No orders to display         Medications:   Scheduled Meds:   atorvastatin  10 mg Oral QHS    levothyroxine  25 mcg Oral Before breakfast     Continuous Infusions:  PRN Meds:.melatonin, morphine, ondansetron, sodium chloride 0.9%      Assessment/Plan:    1. Nephrotic syndrome      - nephrology consult       Case was discussed with the ED provider, Kip Solano MD

## 2023-04-07 NOTE — ED PROVIDER NOTES
"SCRIBE #1 NOTE: I, Jose Moncada, am scribing for, and in the presence of,  Kip Solano DO. I have scribed the following portions of the note - Other sections scribed: HPI, ROS, PE.       Source of History:  Patient    Chief complaint:  Leg Swelling (Bilateral feet, lower legs and ankle swelling for over a week. Was seen here a week ago for the same thing and swelling has not gotten any better)      HPI:  Lilia Vaca is a 43 y.o. female presenting with bilateral lower extremity and abdominal swelling worsening for the past several days. Patient was seen in this ED with similar complaints of abdomina pain and swelling on 3/29/23 and discharged with instructions to return should her symptoms arise again. She denies any SOB.     This is the extent to the patients complaints today here in the emergency department.    ROS:   See HPI.    Review of patient's allergies indicates:  No Known Allergies    PMH:  As per HPI and below:  History reviewed. No pertinent past medical history.  Past Surgical History:   Procedure Laterality Date     SECTION         Social History     Tobacco Use    Smoking status: Never    Smokeless tobacco: Never   Substance Use Topics    Alcohol use: No    Drug use: No       Physical Exam:    /82 (BP Location: Left arm, Patient Position: Sitting)   Pulse 98   Temp 98 °F (36.7 °C) (Oral)   Resp 17   Ht 5' 4" (1.626 m)   Wt 78.2 kg (172 lb 6.4 oz)   LMP 2023   SpO2 99%   Breastfeeding No   BMI 29.59 kg/m²   Nursing note and vital signs reviewed.  Constitutional: No acute distress.  Nontoxic  Cardiovascular: Tachycardic rate.  Regular rhythm.  No murmurs. No gallops. No rubs  Respiratory: Clear to auscultation bilaterally.  Good air movement.  No wheezes.  No rhonchi. No rales. No accessory muscle use..  Abdomen: Soft.  Not distended.  Nontender.  No guarding.  No rebound. Non-peritoneal.  Musculoskeletal: Pitting edema of bilateral lower extremities through to " her abdomen. Good range of motion all joints.  No deformities.  Neck supple.  No meningismus.    Summary of Previous Medical Records:  03/29/2023 senior in the emergency department for similar complaint of leg swelling.  Workup showed 3+ protein as well as CT scan and concern of nephrotic syndrome.  He was discussed with the nephrology at that time to observe the patient and bring in for renal biopsy, however the patient had recently had aspirin and was unable to do the biopsy.  Was discharged to follow up as an outpatient.    MDM/ Differential Dx:    43-year-old female with leg swelling no improvement interval worsening from 1 week ago.  Reviewing her notes it appears that there was discussed about admitting for observation for renal biopsy but was unable to do aspirin.  The patient has not taken any aspirin.  Will get repeat labs to look for any other electrolyte abnormalities or kidney abnormalities.  Will admit to the ED observation unit for Nephrology consultation tomorrow and possible biopsy.    Social Concerns:  Due to the patient's Medicaid patient likely will have difficulty of any reasonable time frame to follow-up with nephrology and get outpatient procedures done.    ED Course as of 04/10/23 1220   Fri Apr 07, 2023   0044 Troponin I: <0.006 [SM]   0044 Sodium: 136 [SM]   0044 Potassium: 3.5 [SM]   0045 CMP is largely unchanged from 1 week ago. [SM]   0045 WBC: 6.83 [SM]   0045 Hemoglobin: 13.4 [SM]   0045 Platelets: 354 [SM]   0133 TSH(!): 4.807 [SM]   0133 Free T4(!): 0.60 [SM]   0859 Patient evaluated by Nephrology, has developed a plan for her and recommends discharge. [MA]      ED Course User Index  [MA] Milo Stanley MD  [SM] Kip Solano, DO                Scribe Attestation:   Scribe #1: I performed the above scribed service and the documentation accurately describes the services I performed. I attest to the accuracy of the note.  I, Dr Kip Solano, personally performed the services  described in this documentation. All medical record entries made by the scribe were at my direction and in my presence. I have reviewed the chart and agree that the record reflects my personal performance and is accurate and complete.   Diagnostic Impression:    1. Nephrotic syndrome         ED Disposition Condition    Discharge Stable            ED Prescriptions       Medication Sig Dispense Start Date End Date Auth. Provider    atorvastatin (LIPITOR) 10 MG tablet Take 1 tablet (10 mg total) by mouth once daily. 30 tablet 4/7/2023 5/7/2023 JOSE LUIS Balbuena    levothyroxine (SYNTHROID) 25 MCG tablet Take 1 tablet (25 mcg total) by mouth before breakfast. 30 tablet 4/7/2023 5/7/2023 JOSE LUIS Balbuena          Follow-up Information       Follow up With Specialties Details Why Contact Info    [facility] - Emergency Dept Emergency Medicine Go to  If symptoms worsen [address] [facility] [address]  [phone]    Clinton Hull NP Nephrology Call   [address] [address]  [phone]               Kip Solano, DO  04/07/23 0136       Kip Solano, DO  04/10/23 1220

## 2023-04-08 LAB
BACTERIA UR CULT: NORMAL
BACTERIA UR CULT: NORMAL

## 2023-04-10 LAB
ALBUMIN SERPL ELPH-MCNC: 1.28 G/DL (ref 3.35–5.55)
ALPHA1 GLOB SERPL ELPH-MCNC: 0.17 G/DL (ref 0.17–0.41)
ALPHA2 GLOB SERPL ELPH-MCNC: 1.23 G/DL (ref 0.43–0.99)
ANA SER QL IF: NORMAL
B-GLOBULIN SERPL ELPH-MCNC: 0.67 G/DL (ref 0.5–1.1)
GAMMA GLOB SERPL ELPH-MCNC: 0.45 G/DL (ref 0.67–1.58)
KAPPA LC SER QL IA: 7.67 MG/DL (ref 0.33–1.94)
KAPPA LC/LAMBDA SER IA: 2.02 (ref 0.26–1.65)
LAMBDA LC SER QL IA: 3.8 MG/DL (ref 0.57–2.63)
PROT SERPL-MCNC: 3.8 G/DL (ref 6–8.4)

## 2023-04-11 LAB
BM IGG SER-ACNC: <0.2 U
PATHOLOGIST INTERPRETATION SPE: NORMAL

## 2023-04-12 LAB
ANCA AB TITR SER IF: NORMAL TITER
ASO AB SERPL-ACNC: 26 IU/ML
INTERPRETATION SERPL IFE-IMP: NORMAL
P-ANCA TITR SER IF: NORMAL TITER
PATHOLOGIST INTERPRETATION IFE: NORMAL

## 2023-04-14 ENCOUNTER — TELEPHONE (OUTPATIENT)
Dept: GENETICS | Facility: CLINIC | Age: 43
End: 2023-04-14
Payer: COMMERCIAL

## 2023-04-14 ENCOUNTER — OFFICE VISIT (OUTPATIENT)
Dept: PRIMARY CARE CLINIC | Facility: CLINIC | Age: 43
End: 2023-04-14
Payer: COMMERCIAL

## 2023-04-14 VITALS
HEART RATE: 90 BPM | HEIGHT: 64 IN | DIASTOLIC BLOOD PRESSURE: 81 MMHG | BODY MASS INDEX: 30.51 KG/M2 | SYSTOLIC BLOOD PRESSURE: 127 MMHG | WEIGHT: 178.69 LBS | RESPIRATION RATE: 16 BRPM

## 2023-04-14 DIAGNOSIS — R93.89 ABNORMAL FINDING PRESENT ON DIAGNOSTIC IMAGING OF UTERUS: ICD-10-CM

## 2023-04-14 DIAGNOSIS — R07.89 ATYPICAL CHEST PAIN: ICD-10-CM

## 2023-04-14 DIAGNOSIS — R79.89 HIGH SERUM PARATHYROID HORMONE (PTH): ICD-10-CM

## 2023-04-14 DIAGNOSIS — Z12.31 ENCOUNTER FOR SCREENING MAMMOGRAM FOR BREAST CANCER: ICD-10-CM

## 2023-04-14 DIAGNOSIS — N04.9 NEPHROTIC SYNDROME: Primary | ICD-10-CM

## 2023-04-14 DIAGNOSIS — N94.9 ADNEXAL CYST: ICD-10-CM

## 2023-04-14 DIAGNOSIS — E78.00 PURE HYPERCHOLESTEROLEMIA: ICD-10-CM

## 2023-04-14 DIAGNOSIS — E03.9 ACQUIRED HYPOTHYROIDISM: ICD-10-CM

## 2023-04-14 LAB
PHOSPHOLIPASE A2 RECEPTOR, ELISA: <2 RU/ML
PLA2R IGG SERPL QL IF: NEGATIVE

## 2023-04-14 PROCEDURE — 93010 ELECTROCARDIOGRAM REPORT: CPT | Mod: S$GLB,,, | Performed by: INTERNAL MEDICINE

## 2023-04-14 PROCEDURE — 93005 EKG 12-LEAD: ICD-10-PCS | Mod: S$GLB,,, | Performed by: FAMILY MEDICINE

## 2023-04-14 PROCEDURE — 1160F PR REVIEW ALL MEDS BY PRESCRIBER/CLIN PHARMACIST DOCUMENTED: ICD-10-PCS | Mod: CPTII,S$GLB,, | Performed by: FAMILY MEDICINE

## 2023-04-14 PROCEDURE — 93005 ELECTROCARDIOGRAM TRACING: CPT | Mod: S$GLB,,, | Performed by: FAMILY MEDICINE

## 2023-04-14 PROCEDURE — 1160F RVW MEDS BY RX/DR IN RCRD: CPT | Mod: CPTII,S$GLB,, | Performed by: FAMILY MEDICINE

## 2023-04-14 PROCEDURE — 3008F BODY MASS INDEX DOCD: CPT | Mod: CPTII,S$GLB,, | Performed by: FAMILY MEDICINE

## 2023-04-14 PROCEDURE — 3074F PR MOST RECENT SYSTOLIC BLOOD PRESSURE < 130 MM HG: ICD-10-PCS | Mod: CPTII,S$GLB,, | Performed by: FAMILY MEDICINE

## 2023-04-14 PROCEDURE — 93010 EKG 12-LEAD: ICD-10-PCS | Mod: S$GLB,,, | Performed by: INTERNAL MEDICINE

## 2023-04-14 PROCEDURE — 3079F PR MOST RECENT DIASTOLIC BLOOD PRESSURE 80-89 MM HG: ICD-10-PCS | Mod: CPTII,S$GLB,, | Performed by: FAMILY MEDICINE

## 2023-04-14 PROCEDURE — 99205 PR OFFICE/OUTPT VISIT, NEW, LEVL V, 60-74 MIN: ICD-10-PCS | Mod: S$GLB,,, | Performed by: FAMILY MEDICINE

## 2023-04-14 PROCEDURE — 99999 PR PBB SHADOW E&M-EST. PATIENT-LVL V: CPT | Mod: PBBFAC,,, | Performed by: FAMILY MEDICINE

## 2023-04-14 PROCEDURE — 99999 PR PBB SHADOW E&M-EST. PATIENT-LVL V: ICD-10-PCS | Mod: PBBFAC,,, | Performed by: FAMILY MEDICINE

## 2023-04-14 PROCEDURE — 1159F PR MEDICATION LIST DOCUMENTED IN MEDICAL RECORD: ICD-10-PCS | Mod: CPTII,S$GLB,, | Performed by: FAMILY MEDICINE

## 2023-04-14 PROCEDURE — 99205 OFFICE O/P NEW HI 60 MIN: CPT | Mod: S$GLB,,, | Performed by: FAMILY MEDICINE

## 2023-04-14 PROCEDURE — 3074F SYST BP LT 130 MM HG: CPT | Mod: CPTII,S$GLB,, | Performed by: FAMILY MEDICINE

## 2023-04-14 PROCEDURE — 3079F DIAST BP 80-89 MM HG: CPT | Mod: CPTII,S$GLB,, | Performed by: FAMILY MEDICINE

## 2023-04-14 PROCEDURE — 1159F MED LIST DOCD IN RCRD: CPT | Mod: CPTII,S$GLB,, | Performed by: FAMILY MEDICINE

## 2023-04-14 PROCEDURE — 3008F PR BODY MASS INDEX (BMI) DOCUMENTED: ICD-10-PCS | Mod: CPTII,S$GLB,, | Performed by: FAMILY MEDICINE

## 2023-04-14 RX ORDER — OMEPRAZOLE 40 MG/1
40 CAPSULE, DELAYED RELEASE ORAL DAILY
Qty: 30 CAPSULE | Refills: 2 | Status: SHIPPED | OUTPATIENT
Start: 2023-04-14 | End: 2023-07-17

## 2023-04-14 RX ORDER — FUROSEMIDE 20 MG/1
20 TABLET ORAL
COMMUNITY
Start: 2023-04-13 | End: 2023-11-02

## 2023-04-14 RX ORDER — ROSUVASTATIN CALCIUM 20 MG/1
20 TABLET, COATED ORAL DAILY
Qty: 90 TABLET | Refills: 1 | Status: SHIPPED | OUTPATIENT
Start: 2023-04-14 | End: 2023-11-06

## 2023-04-14 NOTE — PROGRESS NOTES
Subjective     Patient ID: Lilia Vaca is a 43 y.o. female.    Chief Complaint: Foot Swelling    HPI  Review of Systems   Constitutional:  Negative for fatigue.   Cardiovascular:  Positive for leg swelling.   Gastrointestinal:         Indigestion        Objective     Physical Exam       Assessment and Plan     Problem List Items Addressed This Visit    None  Visit Diagnoses       Nephrotic syndrome    -  Primary    Acquired hypothyroidism        High serum parathyroid hormone (PTH)        Encounter for screening mammogram for breast cancer        Relevant Orders    Mammo Digital Screening Bilat    Abnormal finding present on diagnostic imaging of uterus        Relevant Orders    Ambulatory referral/consult to Gynecology    Adnexal cyst        Relevant Orders    Ambulatory referral/consult to Gynecology    Pure hypercholesterolemia        Relevant Medications    rosuvastatin (CRESTOR) 20 MG tablet    Other Relevant Orders    Ambulatory referral/consult to Genetics            ***

## 2023-04-14 NOTE — TELEPHONE ENCOUNTER
Called pt to let them know that Dr. Masseys is no longer with [facility] and unfortunately Dr. Ortiz will also be moving out of state at the end of June, therefore she won't be seeing any new patients beyond May. Thus, we do not have any genetics appointments available to offer. Provided pt with phone numbers for other geneticists in the area. [facility] ([phone]), [facility] ([phone]), or [facility] in [place] ([phone]).  was unavailable.     ----- Message from Tanya Barrett sent at 4/14/2023  3:53 PM CDT -----  Please reach out to patient to schedule appt

## 2023-04-15 LAB
APO A-I SERPL-MCNC: NORMAL
APO B SERPL-MCNC: NORMAL MG/DL
APOLIPOPROTEIN B/A1 RATIO: NORMAL

## 2023-04-16 NOTE — PROGRESS NOTES
Subjective     Patient ID: Liila Vaca is a 43 y.o. female.    Chief Complaint: Foot Swelling    HPI:  Patient is a 43-year-old female here for follow-up recent ER visits over the past few weeks for evaluation of lower extremity edema, which led to a diagnosis of nephrotic syndrome.  Last pertinent for hypoproteinemia with increased percentages of cancer and lambda free light chains.  PTH was also noted to be elevated at 119.  Patient was prescribed Lasix which she began on today.  Patient does not have a known history of hypertension for family history of kidney disease.  She does have severely elevated hyper cholesterolemia according to recent labs.  She does admit to consuming a lot of seafood recently.  Patient also reports sensation of indigestion in the substernal region which occurs at night.  She denies abdominal pain or heartburn.  Patient had a recent CT scan of the abdomen and pelvic revealed mild perinephric stranding bilaterally in attenuation of the left renal collecting structures.  Incidental finding of prominent appearance of the lower uterine segment/cervix and right adnexal follicle cysts.    Review of Systems   Constitutional:  Negative for unexpected weight change.   Respiratory:  Negative for shortness of breath.    Cardiovascular:  Positive for leg swelling. Negative for chest pain.   Gastrointestinal:  Negative for abdominal pain, blood in stool, change in bowel habit, constipation, diarrhea, nausea and change in bowel habit.        Indigestion   All other systems reviewed and are negative.       Objective     Physical Exam  Constitutional:       Appearance: Normal appearance. She is well-developed.   HENT:      Head: Atraumatic.   Neck:      Thyroid: No thyromegaly.   Cardiovascular:      Rate and Rhythm: Normal rate and regular rhythm.      Heart sounds: Normal heart sounds. No murmur heard.  Pulmonary:      Effort: Pulmonary effort is normal. No respiratory distress.      Breath  sounds: Normal breath sounds. No wheezing or rales.   Abdominal:      General: Bowel sounds are normal. There is no distension.      Palpations: Abdomen is soft. There is no mass.      Tenderness: There is no abdominal tenderness.   Musculoskeletal:      Cervical back: Normal range of motion and neck supple.      Right lower leg: Edema (4+ edema bilat) present.      Left lower leg: Edema present.   Lymphadenopathy:      Cervical: No cervical adenopathy.   Skin:     General: Skin is warm and dry.      Findings: No rash.   Neurological:      Mental Status: She is alert and oriented to person, place, and time.     Results for orders placed or performed during the hospital encounter of 04/06/23   Urine culture    Specimen: Urine   Result Value Ref Range    Urine Culture, Routine       Multiple organisms isolated. None in predominance.  Repeat if    Urine Culture, Routine clinically necessary.    HIV 1/2 Ag/Ab (4th Gen)   Result Value Ref Range    HIV 1/2 Ag/Ab Negative Negative   Hepatitis C Antibody   Result Value Ref Range    Hepatitis C Ab Negative Negative   CBC Auto Differential   Result Value Ref Range    WBC 6.83 3.90 - 12.70 K/uL    RBC 5.12 4.00 - 5.40 M/uL    Hemoglobin 13.4 12.0 - 16.0 g/dL    Hematocrit 42.8 37.0 - 48.5 %    MCV 84 82 - 98 fL    MCH 26.2 (L) 27.0 - 31.0 pg    MCHC 31.3 (L) 32.0 - 36.0 g/dL    RDW 14.9 (H) 11.5 - 14.5 %    Platelets 354 150 - 450 K/uL    MPV 10.8 9.2 - 12.9 fL    Immature Granulocytes 0.1 0.0 - 0.5 %    Gran # (ANC) 4.2 1.8 - 7.7 K/uL    Immature Grans (Abs) 0.01 0.00 - 0.04 K/uL    Lymph # 2.0 1.0 - 4.8 K/uL    Mono # 0.4 0.3 - 1.0 K/uL    Eos # 0.1 0.0 - 0.5 K/uL    Baso # 0.03 0.00 - 0.20 K/uL    nRBC 0 0 /100 WBC    Gran % 62.1 38.0 - 73.0 %    Lymph % 29.3 18.0 - 48.0 %    Mono % 6.3 4.0 - 15.0 %    Eosinophil % 1.8 0.0 - 8.0 %    Basophil % 0.4 0.0 - 1.9 %    Differential Method Automated    Comprehensive Metabolic Panel   Result Value Ref Range    Sodium 136 136 - 145  mmol/L    Potassium 3.5 3.5 - 5.1 mmol/L    Chloride 108 95 - 110 mmol/L    CO2 20 (L) 23 - 29 mmol/L    Glucose 88 70 - 110 mg/dL    BUN 11 6 - 20 mg/dL    Creatinine 0.8 0.5 - 1.4 mg/dL    Calcium 7.7 (L) 8.7 - 10.5 mg/dL    Total Protein 5.1 (L) 6.0 - 8.4 g/dL    Albumin 0.7 (L) 3.5 - 5.2 g/dL    Total Bilirubin 0.1 0.1 - 1.0 mg/dL    Alkaline Phosphatase 53 (L) 55 - 135 U/L    AST 13 10 - 40 U/L    ALT 9 (L) 10 - 44 U/L    Anion Gap 8 8 - 16 mmol/L    eGFR >60 >60 mL/min/1.73 m^2   BNP   Result Value Ref Range    BNP 21 0 - 99 pg/mL   Urinalysis, Reflex to Urine Culture Urine, Clean Catch    Specimen: Urine   Result Value Ref Range    Specimen UA Urine, Clean Catch     Color, UA Yellow Yellow, Straw, Autumn    Appearance, UA Hazy (A) Clear    pH, UA 7.0 5.0 - 8.0    Specific Gravity, UA >1.030 (A) 1.005 - 1.030    Protein, UA 3+ (A) Negative    Glucose, UA Trace (A) Negative    Ketones, UA Negative Negative    Bilirubin (UA) Negative Negative    Occult Blood UA 3+ (A) Negative    Nitrite, UA Negative Negative    Urobilinogen, UA Negative <2.0 EU/dL    Leukocytes, UA Negative Negative   Troponin I   Result Value Ref Range    Troponin I <0.006 0.000 - 0.026 ng/mL   Urinalysis, Reflex to Urine Culture Urine, Clean Catch    Specimen: Urine   Result Value Ref Range    Specimen UA Urine, Clean Catch     Color, UA Yellow Yellow, Straw, Autumn    Appearance, UA Hazy (A) Clear    pH, UA 7.0 5.0 - 8.0    Specific Gravity, UA >1.030 (A) 1.005 - 1.030    Protein, UA 3+ (A) Negative    Glucose, UA Trace (A) Negative    Ketones, UA Negative Negative    Bilirubin (UA) Negative Negative    Occult Blood UA 3+ (A) Negative    Nitrite, UA Negative Negative    Urobilinogen, UA Negative <2.0 EU/dL    Leukocytes, UA Negative Negative   TSH   Result Value Ref Range    TSH 4.807 (H) 0.400 - 4.000 uIU/mL   T4, Free   Result Value Ref Range    Free T4 0.60 (L) 0.71 - 1.51 ng/dL   PÉREZ Screen w/Reflex   Result Value Ref Range    PÉREZ Screen  Negative <1:80 Negative <1:80   Anti-Neutrophilic Cytoplasmic Antibody   Result Value Ref Range    Cytoplasmic Neutrophilic Ab <1:20 <1:20 Titer    Perinuclear (P-ANCA) <1:20 <1:20 Titer   RPR   Result Value Ref Range    RPR Non-reactive Non-reactive   Streptococcal Antibodes (ASO Titer)   Result Value Ref Range    ASO TITER 26 <200 IU/mL   C3 Complement   Result Value Ref Range    Complement (C-3) 138 50 - 180 mg/dL   C4 Complement   Result Value Ref Range    Complement (C-4) 31 11 - 44 mg/dL   Protein Electrophoresis, Serum   Result Value Ref Range    Protein, Serum 3.8 (L) 6.0 - 8.4 g/dL    Albumin 1.28 (L) 3.35 - 5.55 g/dL    Alpha-1 0.17 0.17 - 0.41 g/dL    Alpha-2 1.23 (H) 0.43 - 0.99 g/dL    Beta 0.67 0.50 - 1.10 g/dL    Gamma 0.45 (L) 0.67 - 1.58 g/dL   Hepatitis Panel, Acute   Result Value Ref Range    Hepatitis B Surface Ag Non-reactive Non-reactive    Hep B C IgM Non-reactive Non-reactive    Hep A IgM Non-reactive Non-reactive    Hepatitis C Ab Non-reactive Non-reactive   Immunoglobulin Free LT Chains Blood   Result Value Ref Range    Kappa Free Light Chains 7.67 (H) 0.33 - 1.94 mg/dL    Lambda Free Light Chains 3.80 (H) 0.57 - 2.63 mg/dL    Kappa/Lambda FLC Ratio 2.02 (H) 0.26 - 1.65   Glomerular Basement Membrane Antibodies   Result Value Ref Range    GBM Ab <0.2 <1.0 (Negative) U   Apolipoprotein A1 and B   Result Value Ref Range    Apolipoprotein A1 Test Not Performed     Apolipoprotein B Test Not Performed     Apolipoprotein B/A1 ratio Test Not Performed    Phospholipase A2 Receptor AB, Serum   Result Value Ref Range    Phospholipase A2 Receptor BEATRIS <2 RU/mL   PTH, Intact   Result Value Ref Range    PTH, Intact 119.4 (H) 9.0 - 77.0 pg/mL   Vitamin D   Result Value Ref Range    Vit D, 25-Hydroxy <4 (L) 30 - 96 ng/mL   Protein/Creatinine Ratio, Urine   Result Value Ref Range    Protein, Urine Random >1500 (H) 0 - 15 mg/dL    Creatinine, Urine 337.1 (H) 15.0 - 325.0 mg/dL    Prot/Creat Ratio, Urine  Unable to calculate 0.00 - 0.20   Lipid panel   Result Value Ref Range    Cholesterol 459 (H) 120 - 199 mg/dL    Triglycerides 146 30 - 150 mg/dL    HDL 70 40 - 75 mg/dL    LDL Cholesterol 359.8 (H) 63.0 - 159.0 mg/dL    HDL/Cholesterol Ratio 15.3 (L) 20.0 - 50.0 %    Total Cholesterol/HDL Ratio 6.6 (H) 2.0 - 5.0    Non-HDL Cholesterol 389 mg/dL   Rheumatoid Factor   Result Value Ref Range    Rheumatoid Factor <13.0 0.0 - 15.0 IU/mL   Uric Acid   Result Value Ref Range    Uric Acid 4.2 2.4 - 5.7 mg/dL   Protein, urine, timed   Result Value Ref Range    Urine Collection Duration Random Hr   Urinalysis Microscopic   Result Value Ref Range    RBC, UA 10 (H) 0 - 4 /hpf    WBC, UA 15 (H) 0 - 5 /hpf    Bacteria Moderate (A) None-Occ /hpf    Squam Epithel, UA 22 /hpf    Non-Squam Epith 2 (A) <1/hpf /hpf    Hyaline Casts, UA 28 (A) 0-1/lpf /lpf    Microscopic Comment SEE COMMENT    Pathologist Interpretation SPE   Result Value Ref Range    Pathologist Interpretation SPE REVIEWED    Immunofixation Electrophoresis   Result Value Ref Range    Immunofix Interp. SEE COMMENT    Pathologist Interpretation MELANI   Result Value Ref Range    Pathologist Interpretation MELANI REVIEWED    Phospholipase A2 Receptor Immunofluorescence   Result Value Ref Range    Phospholipase A2 Receptor IFA Negative Negative   POCT urine pregnancy   Result Value Ref Range    POC Preg Test, Ur Negative Negative     Acceptable Yes            Assessment and Plan     Problem List Items Addressed This Visit    None  Visit Diagnoses       Nephrotic syndrome    -  Primary    Acquired hypothyroidism        High serum parathyroid hormone (PTH)        Encounter for screening mammogram for breast cancer        Relevant Orders    Mammo Digital Screening Bilat    Abnormal finding present on diagnostic imaging of uterus        Relevant Orders    Ambulatory referral/consult to Gynecology    Adnexal cyst        Relevant Orders    Ambulatory referral/consult  to Gynecology    Pure hypercholesterolemia        Relevant Medications    rosuvastatin (CRESTOR) 20 MG tablet    omeprazole (PRILOSEC) 40 MG capsule    Other Relevant Orders    Ambulatory referral/consult to Genetics    Atypical chest pain        Relevant Medications    omeprazole (PRILOSEC) 40 MG capsule    Other Relevant Orders    IN OFFICE EKG 12-LEAD (to Vladimir) (Completed)    Indigestion            Lilia was seen today for foot swelling.    Diagnoses and all orders for this visit:    Nephrotic syndrome  Labs and told reviewed in detail.  Patient started Lasix today.  We will coordinate nephrology referral.  -     Ambulatory referral/consult to Internal Medicine    Acquired hypothyroidism  Patient started on Synthroid 25 mcg daily    High serum parathyroid hormone (PTH)  Most likely related to kidney disease.  For the evaluation per nephrology    Encounter for screening mammogram for breast cancer  -     Mammo Digital Screening Bilat; Future    Abnormal finding present on diagnostic imaging of uterus  Patient reports mild pain in the right adnexal region.  Will refer to Ob/gyn for further evaluation.  -     Ambulatory referral/consult to Gynecology; Future    Adnexal cyst  -     Ambulatory referral/consult to Gynecology; Future    Pure hypercholesterolemia  -     Ambulatory referral/consult to Genetics; Future  -     rosuvastatin (CRESTOR) 20 MG tablet; Take 1 tablet (20 mg total) by mouth once daily.  -     omeprazole (PRILOSEC) 40 MG capsule; Take 1 capsule (40 mg total) by mouth once daily.    Atypical chest pain  -     EKG reveals a normal sinus rhythm.  Suspect indigestion.  -     trial of omeprazole (PRILOSEC) 40 MG capsule; Take 1 capsule (40 mg total) by mouth once daily.

## 2023-04-16 NOTE — PROGRESS NOTES
Transitional Care Note  Subjective:       Patient ID: Lilia Vaca is a 43 y.o. female.  Chief Complaint: Foot Swelling    Family and/or Caretaker present at visit?  {YES:[id number]}.  Diagnostic tests reviewed/disposition: {[facility] DIAGNOSTIC TESTS:45003}.  Disease/illness education: ***  Home health/community services discussion/referrals: {[facility] HOME HEALTH:05876}.   Establishment or re-establishment of referral orders for community resources: {[facility] ESTABLISHMENT:97750}.   Discussion with other health care providers: {[facility] DISCUSSION:99194}.   HPI  Review of Systems    Objective:      Physical Exam    Assessment:       1. Nephrotic syndrome    2. Acquired hypothyroidism    3. High serum parathyroid hormone (PTH)    4. Encounter for screening mammogram for breast cancer    5. Abnormal finding present on diagnostic imaging of uterus    6. Adnexal cyst    7. Pure hypercholesterolemia    8. Atypical chest pain    9. Indigestion        Plan:       ***

## 2023-04-20 LAB — THSD7A IGG SERPL QL IF: NEGATIVE

## 2023-04-25 ENCOUNTER — PROCEDURE VISIT (OUTPATIENT)
Dept: OBSTETRICS AND GYNECOLOGY | Facility: CLINIC | Age: 43
End: 2023-04-25
Payer: COMMERCIAL

## 2023-04-25 ENCOUNTER — OFFICE VISIT (OUTPATIENT)
Dept: OBSTETRICS AND GYNECOLOGY | Facility: CLINIC | Age: 43
End: 2023-04-25
Attending: EMERGENCY MEDICINE
Payer: COMMERCIAL

## 2023-04-25 VITALS
DIASTOLIC BLOOD PRESSURE: 82 MMHG | BODY MASS INDEX: 30.11 KG/M2 | SYSTOLIC BLOOD PRESSURE: 118 MMHG | HEIGHT: 64 IN | WEIGHT: 176.38 LBS

## 2023-04-25 DIAGNOSIS — R93.89 ABNORMAL FINDING PRESENT ON DIAGNOSTIC IMAGING OF UTERUS: ICD-10-CM

## 2023-04-25 DIAGNOSIS — N83.201 RIGHT OVARIAN CYST: ICD-10-CM

## 2023-04-25 DIAGNOSIS — N93.9 ABNORMAL UTERINE BLEEDING: ICD-10-CM

## 2023-04-25 DIAGNOSIS — N93.9 ABNORMAL UTERINE BLEEDING: Primary | ICD-10-CM

## 2023-04-25 DIAGNOSIS — N94.9 ADNEXAL CYST: ICD-10-CM

## 2023-04-25 LAB
BASOPHILS # BLD AUTO: 0.02 K/UL (ref 0–0.2)
BASOPHILS NFR BLD: 0.4 % (ref 0–1.9)
DIFFERENTIAL METHOD: ABNORMAL
EOSINOPHIL # BLD AUTO: 0.2 K/UL (ref 0–0.5)
EOSINOPHIL NFR BLD: 2.9 % (ref 0–8)
ERYTHROCYTE [DISTWIDTH] IN BLOOD BY AUTOMATED COUNT: 14.9 % (ref 11.5–14.5)
HAV IGM SERPL QL IA: NORMAL
HBV CORE IGM SERPL QL IA: NORMAL
HBV SURFACE AG SERPL QL IA: NORMAL
HCT VFR BLD AUTO: 40.2 % (ref 37–48.5)
HCV AB SERPL QL IA: NORMAL
HGB BLD-MCNC: 12.4 G/DL (ref 12–16)
IMM GRANULOCYTES # BLD AUTO: 0.01 K/UL (ref 0–0.04)
IMM GRANULOCYTES NFR BLD AUTO: 0.2 % (ref 0–0.5)
LYMPHOCYTES # BLD AUTO: 1.9 K/UL (ref 1–4.8)
LYMPHOCYTES NFR BLD: 37.1 % (ref 18–48)
MCH RBC QN AUTO: 26.4 PG (ref 27–31)
MCHC RBC AUTO-ENTMCNC: 30.8 G/DL (ref 32–36)
MCV RBC AUTO: 86 FL (ref 82–98)
MONOCYTES # BLD AUTO: 0.4 K/UL (ref 0.3–1)
MONOCYTES NFR BLD: 8.4 % (ref 4–15)
NEUTROPHILS # BLD AUTO: 2.6 K/UL (ref 1.8–7.7)
NEUTROPHILS NFR BLD: 51 % (ref 38–73)
NRBC BLD-RTO: 0 /100 WBC
PLATELET # BLD AUTO: 340 K/UL (ref 150–450)
PMV BLD AUTO: 11.3 FL (ref 9.2–12.9)
RBC # BLD AUTO: 4.69 M/UL (ref 4–5.4)
RPR SER QL: NORMAL
TSH SERPL DL<=0.005 MIU/L-ACNC: 3.37 UIU/ML (ref 0.4–4)
WBC # BLD AUTO: 5.09 K/UL (ref 3.9–12.7)

## 2023-04-25 PROCEDURE — 87389 HIV-1 AG W/HIV-1&-2 AB AG IA: CPT

## 2023-04-25 PROCEDURE — 3079F PR MOST RECENT DIASTOLIC BLOOD PRESSURE 80-89 MM HG: ICD-10-PCS | Mod: CPTII,S$GLB,, | Performed by: OBSTETRICS & GYNECOLOGY

## 2023-04-25 PROCEDURE — 3079F DIAST BP 80-89 MM HG: CPT | Mod: CPTII,S$GLB,, | Performed by: OBSTETRICS & GYNECOLOGY

## 2023-04-25 PROCEDURE — 1159F PR MEDICATION LIST DOCUMENTED IN MEDICAL RECORD: ICD-10-PCS | Mod: CPTII,S$GLB,, | Performed by: OBSTETRICS & GYNECOLOGY

## 2023-04-25 PROCEDURE — 86592 SYPHILIS TEST NON-TREP QUAL: CPT

## 2023-04-25 PROCEDURE — 81514 NFCT DS BV&VAGINITIS DNA ALG: CPT

## 2023-04-25 PROCEDURE — 99204 OFFICE O/P NEW MOD 45 MIN: CPT | Mod: S$GLB,,, | Performed by: OBSTETRICS & GYNECOLOGY

## 2023-04-25 PROCEDURE — 84443 ASSAY THYROID STIM HORMONE: CPT

## 2023-04-25 PROCEDURE — 99999 PR PBB SHADOW E&M-EST. PATIENT-LVL III: ICD-10-PCS | Mod: PBBFAC,,, | Performed by: OBSTETRICS & GYNECOLOGY

## 2023-04-25 PROCEDURE — 3008F PR BODY MASS INDEX (BMI) DOCUMENTED: ICD-10-PCS | Mod: CPTII,S$GLB,, | Performed by: OBSTETRICS & GYNECOLOGY

## 2023-04-25 PROCEDURE — 99999 PR PBB SHADOW E&M-EST. PATIENT-LVL III: CPT | Mod: PBBFAC,,, | Performed by: OBSTETRICS & GYNECOLOGY

## 2023-04-25 PROCEDURE — 3074F PR MOST RECENT SYSTOLIC BLOOD PRESSURE < 130 MM HG: ICD-10-PCS | Mod: CPTII,S$GLB,, | Performed by: OBSTETRICS & GYNECOLOGY

## 2023-04-25 PROCEDURE — 99204 PR OFFICE/OUTPT VISIT, NEW, LEVL IV, 45-59 MIN: ICD-10-PCS | Mod: S$GLB,,, | Performed by: OBSTETRICS & GYNECOLOGY

## 2023-04-25 PROCEDURE — 85025 COMPLETE CBC W/AUTO DIFF WBC: CPT

## 2023-04-25 PROCEDURE — 3074F SYST BP LT 130 MM HG: CPT | Mod: CPTII,S$GLB,, | Performed by: OBSTETRICS & GYNECOLOGY

## 2023-04-25 PROCEDURE — 1159F MED LIST DOCD IN RCRD: CPT | Mod: CPTII,S$GLB,, | Performed by: OBSTETRICS & GYNECOLOGY

## 2023-04-25 PROCEDURE — 80074 ACUTE HEPATITIS PANEL: CPT

## 2023-04-25 PROCEDURE — 3008F BODY MASS INDEX DOCD: CPT | Mod: CPTII,S$GLB,, | Performed by: OBSTETRICS & GYNECOLOGY

## 2023-04-25 PROCEDURE — 87591 N.GONORRHOEAE DNA AMP PROB: CPT

## 2023-04-25 NOTE — PROGRESS NOTES
History & Physical  Gynecology      SUBJECTIVE:     Chief Complaint: Ovarian Cyst     History of Present Illness:  Lilia Vaca is a 44 yo  who presents for ED follow-up 2/2 incidental finding of 2.2 cm right adnexal follicle. Patient presented to ED for evaluation of lower extremity edema and was diagnosed with nephrotic syndrome. Patient had no abdominal pain at time of CT imaging.     Patient reports menstrual cycles 2x per month for past 3 months. Reports each period last 3-5 days and has heavy clots. Denies breakthrough bleeding. Denies pain with periods. Not on contraception. Denies history of irregular periods.     Review of patient's allergies indicates:  No Known Allergies    History reviewed. No pertinent past medical history.  Past Surgical History:   Procedure Laterality Date     SECTION       OB History          6    Para   3    Term   3            AB   3    Living             SAB        IAB        Ectopic        Multiple        Live Births                   History reviewed. No pertinent family history.  Social History     Tobacco Use    Smoking status: Never    Smokeless tobacco: Never   Substance Use Topics    Alcohol use: No    Drug use: No       Current Outpatient Medications   Medication Sig    furosemide (LASIX) 20 MG tablet Take 20 mg by mouth.    levothyroxine (SYNTHROID) 25 MCG tablet Take 1 tablet (25 mcg total) by mouth before breakfast.    omeprazole (PRILOSEC) 40 MG capsule Take 1 capsule (40 mg total) by mouth once daily.    rosuvastatin (CRESTOR) 20 MG tablet Take 1 tablet (20 mg total) by mouth once daily.    DM/PSEUDOEPHED/ACETAMINOPH/CPM (THERAFLU COLD-COUGH ORAL) Take by mouth.     No current facility-administered medications for this visit.     Review of Systems   Constitutional:  Negative for appetite change, diaphoresis, fever and unexpected weight change.   Eyes:  Negative for visual disturbance.   Respiratory:  Negative for cough.     Cardiovascular:  Negative for chest pain and leg swelling.   Gastrointestinal:  Negative for abdominal pain, constipation, diarrhea, nausea and vomiting.   Genitourinary:  Positive for menstrual problem. Negative for dysmenorrhea, dyspareunia, dysuria, frequency, menorrhagia, pelvic pain, urgency, vaginal bleeding, vaginal discharge, vaginal pain, vaginal dryness and vaginal odor.   Musculoskeletal:  Negative for arthralgias and back pain.   Integumentary:  Negative for nipple discharge.   Neurological:  Negative for headaches.   Psychiatric/Behavioral:  The patient is not nervous/anxious.    Breast: Negative for nipple discharge     OBJECTIVE:     Physical Exam:  Physical Exam  Vitals and nursing note reviewed. Exam conducted with a chaperone present.   Constitutional:       General: She is not in acute distress.     Appearance: Normal appearance. She is not diaphoretic.   HENT:      Head: Normocephalic.   Eyes:      Extraocular Movements: Extraocular movements intact.      Pupils: Pupils are equal, round, and reactive to light.   Cardiovascular:      Rate and Rhythm: Normal rate.   Pulmonary:      Effort: Pulmonary effort is normal.      Comments: Normal work of breathing  Abdominal:      General: There is no distension.      Palpations: Abdomen is soft.      Tenderness: There is no abdominal tenderness.   Genitourinary:     Labia:         Right: No rash, tenderness, lesion or injury.         Left: No rash, tenderness, lesion or injury.       Vagina: No signs of injury. No vaginal discharge, erythema, tenderness, bleeding or lesions.      Cervix: No cervical motion tenderness, discharge, friability, lesion, erythema or cervical bleeding.      Uterus: Normal. Not enlarged, not fixed, not tender and no uterine prolapse.       Adnexa: Right adnexa normal and left adnexa normal.        Right: No mass, tenderness or fullness.          Left: No mass, tenderness or fullness.     Musculoskeletal:         General: Normal  range of motion.      Cervical back: Normal range of motion.   Skin:     General: Skin is warm and dry.   Neurological:      Mental Status: She is alert.         ASSESSMENT:       ICD-10-CM ICD-9-CM    1. Abnormal uterine bleeding  N93.9 626.9 CBC Auto Differential      TSH      C. trachomatis/N. gonorrhoeae by AMP DNA      Vaginosis Screen by DNA Probe      HIV 1/2 Ag/Ab (4th Gen)      RPR      Hepatitis Panel, Acute      2. Right ovarian cyst  N83.201 620.2 Ambulatory referral/consult to Obstetrics / Gynecology      3. Abnormal finding present on diagnostic imaging of uterus  R93.89 793.99 Ambulatory referral/consult to Gynecology      4. Adnexal cyst  N94.9 625.8 Ambulatory referral/consult to Gynecology             Plan:      Right Ovarian Cyst:   - Incidental imaging consistent with 2.2 cm right ovarian follicle/ cyst.   - Abdominal exam: completed, benign   - Discussed benign nature of follicle/cyst and likely resolution. As the cyst causes no symptoms and is less than 2 cm, do not recommend intervention at this time.    2. AUB:   - Patient with recent history of irregular bleeding.   - Discussed etiologies associated with AUB, including PALM-COIEN.   - UPT: negative  - Will get CBC, TSH, GCCT to rule out other causes.   - If bleeding is persistent if above are negative, can consider TVUS to evaluate for structural causes.     Counseling time: 15 minutes    Nettie Díaz MD/MPH  OB/GYN PGY-2  [facility]

## 2023-04-26 LAB — HIV 1+2 AB+HIV1 P24 AG SERPL QL IA: NORMAL

## 2023-04-27 LAB
BACTERIAL VAGINOSIS DNA: NEGATIVE
C TRACH DNA SPEC QL NAA+PROBE: NOT DETECTED
CANDIDA GLABRATA DNA: NEGATIVE
CANDIDA KRUSEI DNA: NEGATIVE
CANDIDA RRNA VAG QL PROBE: NEGATIVE
N GONORRHOEA DNA SPEC QL NAA+PROBE: NOT DETECTED
T VAGINALIS RRNA GENITAL QL PROBE: NEGATIVE

## 2023-05-15 NOTE — PROGRESS NOTES
Lab Documentation:    Order Type: Written Order placed in [facility]    Patient in for lab visit only per provider treatment plan.

## 2023-07-16 DIAGNOSIS — E78.00 PURE HYPERCHOLESTEROLEMIA: ICD-10-CM

## 2023-07-16 DIAGNOSIS — R07.89 ATYPICAL CHEST PAIN: ICD-10-CM

## 2023-07-16 NOTE — TELEPHONE ENCOUNTER
No care due was identified.  [facility] Embedded Care Due Messages. Reference number: [id number].   7/16/2023 7:32:06 AM CDT

## 2023-07-17 RX ORDER — OMEPRAZOLE 40 MG/1
CAPSULE, DELAYED RELEASE ORAL
Qty: 90 CAPSULE | Refills: 2 | Status: SHIPPED | OUTPATIENT
Start: 2023-07-17

## 2023-07-17 NOTE — TELEPHONE ENCOUNTER
Refill Decision Note      Refill Decision Note   Lilia Vaca  is requesting a refill authorization.  Brief Assessment and Rationale for Refill:  Approve     Medication Therapy Plan:         Comments:     Note composed:8:51 AM 07/17/2023             Appointments     Last Visit   4/14/2023 Odalis Hamilton MD   Next Visit   Visit date not found Odalis Hamilton MD

## 2023-08-23 ENCOUNTER — TELEPHONE (OUTPATIENT)
Dept: PRIMARY CARE CLINIC | Facility: CLINIC | Age: 43
End: 2023-08-23
Payer: COMMERCIAL

## 2023-08-23 NOTE — TELEPHONE ENCOUNTER
----- Message from Christina Bernardo sent at 8/23/2023 12:27 PM CDT -----  Contact: Pt [phone]  Patient is requesting an appointment in the month of Sept in the afternoon (after 1:00pm)    Follow up/Medication refills    Please call and advise.    Thank You

## 2023-08-23 NOTE — TELEPHONE ENCOUNTER
Spoke to patient ,she is requesting an appointment in September after 1 pm. Advised patient will schedule appointment as soon as Dr Hamilton's schedule opens. Advised you will be able to view your appointment on the portal.patient verbalized understanding.

## 2023-08-25 ENCOUNTER — TELEPHONE (OUTPATIENT)
Dept: NEPHROLOGY | Facility: CLINIC | Age: 43
End: 2023-08-25
Payer: COMMERCIAL

## 2023-08-25 ENCOUNTER — PATIENT MESSAGE (OUTPATIENT)
Dept: PRIMARY CARE CLINIC | Facility: CLINIC | Age: 43
End: 2023-08-25
Payer: COMMERCIAL

## 2023-11-02 ENCOUNTER — OFFICE VISIT (OUTPATIENT)
Dept: PRIMARY CARE CLINIC | Facility: CLINIC | Age: 43
End: 2023-11-02
Payer: MEDICAID

## 2023-11-02 VITALS
DIASTOLIC BLOOD PRESSURE: 74 MMHG | WEIGHT: 188.06 LBS | OXYGEN SATURATION: 98 % | SYSTOLIC BLOOD PRESSURE: 109 MMHG | HEIGHT: 64 IN | BODY MASS INDEX: 32.11 KG/M2 | HEART RATE: 79 BPM

## 2023-11-02 DIAGNOSIS — K76.89 NODULE ON LIVER: ICD-10-CM

## 2023-11-02 DIAGNOSIS — E78.00 PURE HYPERCHOLESTEROLEMIA: ICD-10-CM

## 2023-11-02 DIAGNOSIS — N04.9 NEPHROTIC SYNDROME: ICD-10-CM

## 2023-11-02 DIAGNOSIS — E83.51 HYPOCALCEMIA: ICD-10-CM

## 2023-11-02 DIAGNOSIS — Z09 HOSPITAL DISCHARGE FOLLOW-UP: Primary | ICD-10-CM

## 2023-11-02 DIAGNOSIS — Z13.1 SCREENING FOR DIABETES MELLITUS: ICD-10-CM

## 2023-11-02 DIAGNOSIS — E88.09 HYPOALBUMINEMIA: ICD-10-CM

## 2023-11-02 DIAGNOSIS — Z12.31 ENCOUNTER FOR SCREENING MAMMOGRAM FOR BREAST CANCER: ICD-10-CM

## 2023-11-02 DIAGNOSIS — N05.1 FOCAL SEGMENTAL GLOMERULOSCLEROSIS: ICD-10-CM

## 2023-11-02 DIAGNOSIS — N18.2 CKD (CHRONIC KIDNEY DISEASE) STAGE 2, GFR 60-89 ML/MIN: ICD-10-CM

## 2023-11-02 PROCEDURE — 99215 OFFICE O/P EST HI 40 MIN: CPT | Mod: S$PBB,,, | Performed by: NURSE PRACTITIONER

## 2023-11-02 PROCEDURE — 3078F PR MOST RECENT DIASTOLIC BLOOD PRESSURE < 80 MM HG: ICD-10-PCS | Mod: CPTII,,, | Performed by: NURSE PRACTITIONER

## 2023-11-02 PROCEDURE — 3008F BODY MASS INDEX DOCD: CPT | Mod: CPTII,,, | Performed by: NURSE PRACTITIONER

## 2023-11-02 PROCEDURE — 4010F ACE/ARB THERAPY RXD/TAKEN: CPT | Mod: CPTII,,, | Performed by: NURSE PRACTITIONER

## 2023-11-02 PROCEDURE — 99215 OFFICE O/P EST HI 40 MIN: CPT | Mod: PBBFAC,PN | Performed by: NURSE PRACTITIONER

## 2023-11-02 PROCEDURE — 99999 PR PBB SHADOW E&M-EST. PATIENT-LVL V: ICD-10-PCS | Mod: PBBFAC,,, | Performed by: NURSE PRACTITIONER

## 2023-11-02 PROCEDURE — 99215 PR OFFICE/OUTPT VISIT, EST, LEVL V, 40-54 MIN: ICD-10-PCS | Mod: S$PBB,,, | Performed by: NURSE PRACTITIONER

## 2023-11-02 PROCEDURE — 1159F PR MEDICATION LIST DOCUMENTED IN MEDICAL RECORD: ICD-10-PCS | Mod: CPTII,,, | Performed by: NURSE PRACTITIONER

## 2023-11-02 PROCEDURE — 3074F PR MOST RECENT SYSTOLIC BLOOD PRESSURE < 130 MM HG: ICD-10-PCS | Mod: CPTII,,, | Performed by: NURSE PRACTITIONER

## 2023-11-02 PROCEDURE — 1159F MED LIST DOCD IN RCRD: CPT | Mod: CPTII,,, | Performed by: NURSE PRACTITIONER

## 2023-11-02 PROCEDURE — 4010F PR ACE/ARB THEARPY RXD/TAKEN: ICD-10-PCS | Mod: CPTII,,, | Performed by: NURSE PRACTITIONER

## 2023-11-02 PROCEDURE — 3008F PR BODY MASS INDEX (BMI) DOCUMENTED: ICD-10-PCS | Mod: CPTII,,, | Performed by: NURSE PRACTITIONER

## 2023-11-02 PROCEDURE — 3074F SYST BP LT 130 MM HG: CPT | Mod: CPTII,,, | Performed by: NURSE PRACTITIONER

## 2023-11-02 PROCEDURE — 99999 PR PBB SHADOW E&M-EST. PATIENT-LVL V: CPT | Mod: PBBFAC,,, | Performed by: NURSE PRACTITIONER

## 2023-11-02 PROCEDURE — 3078F DIAST BP <80 MM HG: CPT | Mod: CPTII,,, | Performed by: NURSE PRACTITIONER

## 2023-11-02 RX ORDER — SULFAMETHOXAZOLE AND TRIMETHOPRIM 800; 160 MG/1; MG/1
1 TABLET ORAL
COMMUNITY
Start: 2023-10-27

## 2023-11-02 RX ORDER — LISINOPRIL 2.5 MG/1
2.5 TABLET ORAL
COMMUNITY
Start: 2023-10-27

## 2023-11-02 RX ORDER — PREDNISONE 20 MG/1
60 TABLET ORAL
COMMUNITY
Start: 2023-10-27

## 2023-11-02 RX ORDER — BUMETANIDE 2 MG/1
2 TABLET ORAL 2 TIMES DAILY
COMMUNITY
Start: 2023-10-30

## 2023-11-02 RX ORDER — MULTIVITAMIN
1 TABLET ORAL DAILY
COMMUNITY
Start: 2023-09-06 | End: 2023-12-05

## 2023-11-02 NOTE — PROGRESS NOTES
"Subjective:       Patient ID: Lilia Vaca is a 43 y.o. female.    Transitional Care Note    Family and/or Caretaker present at visit?  No.  Diagnostic tests reviewed/disposition: I have reviewed all completed as well as pending diagnostic tests at the time of discharge.  Disease/illness education: Yes.  Home health/community services discussion/referrals: Patient does not have home health established from hospital visit.  They do not need home health.  If needed, we will set up home health for the patient.   Establishment or re-establishment of referral orders for community resources: No other necessary community resources.   Discussion with other health care providers: No discussion with other health care providers necessary.     Chief Complaint: Hospital Follow Up    Ms. Lilia Vaca is a 43 year old female, new to me, presents to the clinic for hospital discharge follow-up. PCP is Odalis Hamilton. Medical and surgical history in addition to problem list reviewed as listed below.     Hospital admission 10/25/2023-10/27/2023.    Presents to emergency room as [facility] with complaints of shortness of breath and abdominal swelling.  Diuresed with Bumex drip, approximately 4 L removed, reports that she had been noncompliant with daily administration furosemide prednisone as previously ordered.    10/23/2023 US Abdomen Complete with "nodular appearance of the liver."         History reviewed. No pertinent past medical history.    Patient Active Problem List   Diagnosis    Chest pain       Past Surgical History:   Procedure Laterality Date     SECTION         Family History   Problem Relation Age of Onset    No Known Problems Mother     Kidney disease Father        Social History     Tobacco Use   Smoking Status Never   Smokeless Tobacco Never       Social History     Social History Narrative    Not on file       Medications have been reviewed and reconciled.     Review of patient's " "allergies indicates:  No Known Allergies     Review of Systems   Constitutional:  Positive for fatigue. Negative for fever.   Respiratory:  Negative for chest tightness, shortness of breath and wheezing.    Cardiovascular:  Positive for leg swelling. Negative for chest pain and palpitations.   Gastrointestinal:  Positive for abdominal distention. Negative for abdominal pain, constipation, diarrhea and nausea.   Skin: Negative.    Neurological:  Negative for dizziness, light-headedness and headaches.         Objective:        /74 (BP Location: Left arm, Patient Position: Sitting, BP Method: Small (Automatic))   Pulse 79   Ht 5' 4" (1.626 m)   Wt 85.3 kg (188 lb 0.8 oz)   LMP 10/23/2023   SpO2 98%   BMI 32.28 kg/m²      Physical Exam  Constitutional:       Appearance: Normal appearance.   HENT:      Right Ear: External ear normal.      Left Ear: External ear normal.   Eyes:      Conjunctiva/sclera: Conjunctivae normal.   Cardiovascular:      Pulses: Normal pulses.   Pulmonary:      Effort: Pulmonary effort is normal. No respiratory distress.   Abdominal:      General: Bowel sounds are normal. There is distension.      Tenderness: There is no abdominal tenderness. There is no guarding.   Musculoskeletal:      Cervical back: Normal range of motion.      Right lower leg: Edema present.      Left lower leg: Edema present.   Skin:     General: Skin is warm and dry.      Capillary Refill: Capillary refill takes 2 to 3 seconds.   Neurological:      Mental Status: She is alert and oriented to person, place, and time.         Assessment:       1. Hospital discharge follow-up    2. Focal segmental glomerulosclerosis    3. Nephrotic syndrome    4. CKD (chronic kidney disease) stage 2, GFR 60-89 ml/min    5. Pure hypercholesterolemia    6. Nodule on liver    7. Hypocalcemia    8. Hypoalbuminemia    9. Screening for diabetes mellitus    10. Encounter for screening mammogram for breast cancer        Plan:       Lilia " was seen today for hospital follow up.    Diagnoses and all orders for this visit:    Hospital discharge follow-up  Stable since discharge.    Review of labs from [facility] 10/25/2023-10/27/2023.    Focal segmental glomerulosclerosis    Nephrotic syndrome    CKD (chronic kidney disease) stage 2, GFR 60-89 ml/min  Scheduled to follow-up with Nephrology, Wednesday November 8, 2023.  10/25/2023 BUN 33 Creat 1.85 10/27/2023 BUN 47 Creat 2.21    Pure hypercholesterolemia  04/07/2023 CHOL 459 .8  Recommend Dash/Mediterranean diet.  -     Lipid Panel; Future    Nodule on liver  -     Ambulatory referral/consult to Hepatology; Future    Hypocalcemia  10/27/2023 6.9    Hypoalbuminemia  10/27/2023 1.4    Screening for diabetes mellitus  -     Hemoglobin A1C; Future    Encounter for screening mammogram for breast cancer  -     Mammo Digital Screening Bilat w/ Darrion; Future    [place] [facility] [place] [facility], patient encouraged to keep a binder for health management, verbalized understanding  Extensive teaching on lifestyle modification, diet, exercise taking medication as prescribed.      Follow up if symptoms worsen or fail to improve.    I spent a total of 54 minutes on the day of the visit.This includes face to face time and non-face to face time preparing to see the patient (eg, review of tests), obtaining and/or reviewing separately obtained history, documenting clinical information in the electronic or other health record, independently interpreting results and communicating results to the patient/family/caregiver, or care coordinator.

## 2023-11-06 DIAGNOSIS — E78.00 PURE HYPERCHOLESTEROLEMIA: Primary | ICD-10-CM

## 2023-11-06 RX ORDER — ROSUVASTATIN CALCIUM 40 MG/1
40 TABLET, COATED ORAL NIGHTLY
Qty: 90 TABLET | Refills: 3 | Status: SHIPPED | OUTPATIENT
Start: 2023-11-06 | End: 2024-11-05

## 2023-11-09 ENCOUNTER — HOSPITAL ENCOUNTER (OUTPATIENT)
Dept: RADIOLOGY | Facility: OTHER | Age: 43
Discharge: HOME | End: 2023-11-09
Attending: NURSE PRACTITIONER
Payer: MEDICAID

## 2023-11-09 DIAGNOSIS — Z12.31 ENCOUNTER FOR SCREENING MAMMOGRAM FOR BREAST CANCER: ICD-10-CM

## 2023-11-09 PROCEDURE — 77067 MAMMO DIGITAL SCREENING BILAT WITH TOMO: ICD-10-PCS | Mod: 26,,, | Performed by: RADIOLOGY

## 2023-11-09 PROCEDURE — 77067 SCR MAMMO BI INCL CAD: CPT | Mod: TC

## 2023-11-09 PROCEDURE — 77067 SCR MAMMO BI INCL CAD: CPT | Mod: 26,,, | Performed by: RADIOLOGY

## 2023-11-09 PROCEDURE — 77063 MAMMO DIGITAL SCREENING BILAT WITH TOMO: ICD-10-PCS | Mod: 26,,, | Performed by: RADIOLOGY

## 2023-11-09 PROCEDURE — 77063 BREAST TOMOSYNTHESIS BI: CPT | Mod: 26,,, | Performed by: RADIOLOGY

## 2024-07-23 ENCOUNTER — OFFICE VISIT (OUTPATIENT)
Dept: OBSTETRICS AND GYNECOLOGY | Facility: CLINIC | Age: 44
End: 2024-07-23
Payer: MEDICAID

## 2024-07-23 VITALS
SYSTOLIC BLOOD PRESSURE: 111 MMHG | HEIGHT: 64 IN | WEIGHT: 165.56 LBS | DIASTOLIC BLOOD PRESSURE: 78 MMHG | BODY MASS INDEX: 28.27 KG/M2

## 2024-07-23 DIAGNOSIS — Z11.3 SCREENING EXAMINATION FOR STI: ICD-10-CM

## 2024-07-23 DIAGNOSIS — Z12.4 CERVICAL CANCER SCREENING: Primary | ICD-10-CM

## 2024-07-23 DIAGNOSIS — N92.6 IRREGULAR MENSES: ICD-10-CM

## 2024-07-23 DIAGNOSIS — Z01.419 WELL WOMAN EXAM WITH ROUTINE GYNECOLOGICAL EXAM: ICD-10-CM

## 2024-07-23 PROCEDURE — 87624 HPV HI-RISK TYP POOLED RSLT: CPT | Performed by: OBSTETRICS & GYNECOLOGY

## 2024-07-23 PROCEDURE — 88175 CYTOPATH C/V AUTO FLUID REDO: CPT | Performed by: OBSTETRICS & GYNECOLOGY

## 2024-07-23 PROCEDURE — 87591 N.GONORRHOEAE DNA AMP PROB: CPT | Performed by: OBSTETRICS & GYNECOLOGY

## 2024-07-23 PROCEDURE — 87491 CHLMYD TRACH DNA AMP PROBE: CPT | Performed by: OBSTETRICS & GYNECOLOGY

## 2024-07-23 PROCEDURE — 99213 OFFICE O/P EST LOW 20 MIN: CPT | Mod: PBBFAC | Performed by: OBSTETRICS & GYNECOLOGY

## 2024-07-23 PROCEDURE — 99999 PR PBB SHADOW E&M-EST. PATIENT-LVL III: CPT | Mod: PBBFAC,,, | Performed by: OBSTETRICS & GYNECOLOGY

## 2024-07-23 NOTE — PROGRESS NOTES
"Past medical, surgical, social, family, and obstetric histories; medications; prior records and results; and available outside records were reviewed and updated in the EMR.  Pertinent findings were noted below.    Reason for Visit   Well Woman    HPI   44 y.o. female     New patient: No    Menopausal: No    History of abnormal paps: DENIES  Menstrual history: 15yoa/R/4d  Abnormal or postmenopausal bleeding:  sometimes having cycles twice a month, happens 2x/year  History of abnormal mammograms:DENIES   Family history of breast or ovarian cancer: DENIES  Any breast masses, pain, skin changes, or nipple discharge: DENIES  Possible recent STD exposure: hasn't been SA in 6 months, prior with males, desires full STD testing today  Contraception: None    Felt irritated and used Monistat 1 day treatment and noticed that her symptoms have improved  Takes kidney medication and wonders if is affecting her cycles    Pap:  No recent documented pap  Mammogram:  BIRADS1, T-C=5%  Allergies: Patient has no known allergies.    Review of Systems   Constitutional:  Negative for chills and fever.   Eyes:  Negative for visual disturbance.   Respiratory:  Negative for cough and shortness of breath.    Cardiovascular:  Negative for chest pain and leg swelling.   Gastrointestinal:  Negative for abdominal pain, nausea and vomiting.   Genitourinary:  Positive for menstrual problem. Negative for dysuria, flank pain, frequency, urgency and vaginal bleeding.   Integumentary:  Negative for breast mass.   Neurological:  Negative for syncope and headaches.   Psychiatric/Behavioral:  Negative for depression. The patient is not nervous/anxious.    All other systems reviewed and are negative.  Breast: Negative for mass and mastodynia      Exam   /78   Ht 5' 4" (1.626 m)   Wt 75.1 kg (165 lb 9.1 oz)   LMP 2024   BMI 28.42 kg/m²     Physical Exam  Constitutional:       General: She is not in acute distress.     Appearance: " Normal appearance. She is not ill-appearing.     Genitourinary:    Vulva, vagina, uterus, right adnexa and left adnexa normal.   No vaginal discharge in the vagina. Cervix is normal. Breasts:     Breasts are soft.     Right: No mass, nipple discharge, skin change or tenderness.      Left: No mass, nipple discharge, skin change or tenderness.   HENT:      Head: Normocephalic and atraumatic.   Pulmonary:      Effort: Pulmonary effort is normal.   Abdominal:      Palpations: Abdomen is soft. There is no mass.      Tenderness: There is no abdominal tenderness.   Musculoskeletal:         General: Normal range of motion.   Lymphadenopathy:      Upper Body:      Right upper body: No axillary adenopathy.      Left upper body: No axillary adenopathy.   Neurological:      General: No focal deficit present.      Mental Status: She is alert and oriented to person, place, and time.   Psychiatric:         Mood and Affect: Mood normal.         Behavior: Behavior normal.         Thought Content: Thought content normal.         Judgment: Judgment normal.   Vitals reviewed.       Assessment and Plan   Cervical cancer screening  -     Liquid-Based Pap Smear, Screening  -     HPV High Risk Genotypes, PCR    Screening examination for STI  -     C. trachomatis/N. gonorrhoeae by AMP DNA  -     HIV 1/2 Ag/Ab (4th Gen); Future; Expected date: 07/23/2024  -     Treponema Pallidium Antibodies IgG, IgM; Future; Expected date: 07/23/2024    Irregular menses  -     TSH; Future; Expected date: 07/23/2024  -     US Pelvis Comp with Transvag NON-OB (xpd; Future; Expected date: 07/23/2024    Well woman exam with routine gynecological exam      Annual exam  Breast and pelvic exam: performed today, see above  Patient was counseled on ASCCP guidelines for cervical cytology screening  Cervical screening: cotest sent  Patient was counseled on current recommendations for breast cancer screening  Mammogram screening: UTD  VitD/Ca supplementation  recommendations based on age:  <50yoa - Ca 1000mg/day, VitD 600IU/day  51-70yoa - Ca 1200mg/day, VitD 600IU/day  >71yoa - Ca 1200mg/day, VitD 800IU/day  STD testing: desires, full testing sent per patient request  Contraception: declines  AUB workup with labs/US started    She was counseled to follow up with her PCP for other routine health maintenance

## 2024-07-24 ENCOUNTER — HOSPITAL ENCOUNTER (OUTPATIENT)
Dept: RADIOLOGY | Facility: OTHER | Age: 44
Discharge: HOME | End: 2024-07-24
Attending: OBSTETRICS & GYNECOLOGY
Payer: MEDICAID

## 2024-07-24 DIAGNOSIS — N92.6 IRREGULAR MENSES: ICD-10-CM

## 2024-07-24 LAB
C TRACH DNA SPEC QL NAA+PROBE: NOT DETECTED
N GONORRHOEA DNA SPEC QL NAA+PROBE: NOT DETECTED

## 2024-07-24 PROCEDURE — 76830 TRANSVAGINAL US NON-OB: CPT | Mod: 26,,, | Performed by: RADIOLOGY

## 2024-07-24 PROCEDURE — 76830 TRANSVAGINAL US NON-OB: CPT | Mod: TC

## 2024-07-24 PROCEDURE — 76856 US EXAM PELVIC COMPLETE: CPT | Mod: TC

## 2024-07-24 PROCEDURE — 76856 US EXAM PELVIC COMPLETE: CPT | Mod: 26,,, | Performed by: RADIOLOGY

## 2024-07-25 LAB
CLINICAL INFO: NORMAL
DATE OF PREVIOUS PAP: NORMAL
DATE PREVIOUS BX: NO
LMP START DATE: NORMAL
SPECIMEN SOURCE CVX/VAG CYTO: NORMAL

## 2024-08-27 ENCOUNTER — OFFICE VISIT (OUTPATIENT)
Dept: OBSTETRICS AND GYNECOLOGY | Facility: CLINIC | Age: 44
End: 2024-08-27
Payer: MEDICAID

## 2024-08-27 VITALS
DIASTOLIC BLOOD PRESSURE: 74 MMHG | HEIGHT: 64 IN | SYSTOLIC BLOOD PRESSURE: 116 MMHG | BODY MASS INDEX: 27.1 KG/M2 | WEIGHT: 158.75 LBS

## 2024-08-27 DIAGNOSIS — N93.9 ABNORMAL UTERINE BLEEDING (AUB): Primary | ICD-10-CM

## 2024-08-27 PROBLEM — N05.1 FSGS (FOCAL SEGMENTAL GLOMERULOSCLEROSIS): Status: ACTIVE | Noted: 2024-08-27

## 2024-08-27 PROBLEM — N18.2 CKD (CHRONIC KIDNEY DISEASE) STAGE 2, GFR 60-89 ML/MIN: Status: ACTIVE | Noted: 2023-05-31

## 2024-08-27 PROBLEM — N04.9 NEPHROTIC SYNDROME: Status: ACTIVE | Noted: 2023-04-13

## 2024-08-27 PROCEDURE — 3074F SYST BP LT 130 MM HG: CPT | Mod: CPTII,,, | Performed by: OBSTETRICS & GYNECOLOGY

## 2024-08-27 PROCEDURE — 3078F DIAST BP <80 MM HG: CPT | Mod: CPTII,,, | Performed by: OBSTETRICS & GYNECOLOGY

## 2024-08-27 PROCEDURE — 99212 OFFICE O/P EST SF 10 MIN: CPT | Mod: PBBFAC | Performed by: OBSTETRICS & GYNECOLOGY

## 2024-08-27 PROCEDURE — 99999 PR PBB SHADOW E&M-EST. PATIENT-LVL II: CPT | Mod: PBBFAC,,, | Performed by: OBSTETRICS & GYNECOLOGY

## 2024-08-27 PROCEDURE — 4010F ACE/ARB THERAPY RXD/TAKEN: CPT | Mod: CPTII,,, | Performed by: OBSTETRICS & GYNECOLOGY

## 2024-08-27 PROCEDURE — 99212 OFFICE O/P EST SF 10 MIN: CPT | Mod: S$PBB,,, | Performed by: OBSTETRICS & GYNECOLOGY

## 2024-08-27 PROCEDURE — 3008F BODY MASS INDEX DOCD: CPT | Mod: CPTII,,, | Performed by: OBSTETRICS & GYNECOLOGY

## 2024-08-27 NOTE — PROGRESS NOTES
"Past medical, surgical, social, family, and obstetric histories; medications; prior records and results; and available outside records were reviewed and updated in the EMR.  Pertinent findings were noted below.    Reason for Visit   No chief complaint on file.    HPI   44 y.o. female     Patient's last menstrual period was 2024.    Here for follow up after workup for AUB. States her cycles are sometimes happening 2x/month but they are really just 25-26d apart. The first 2 days are heavy and painful.  TSH: WNL  US: EXAMINATION:  US PELVIS COMP WITH TRANSVAG NON-OB (XPD)    CLINICAL HISTORY:  evaluate for fibroids, polyp;  Irregular menstruation, unspecified     FINDINGS:  Uterus measures 8 x 5.4 x 5.8 cm, endometrium 6 mm.  The uterus is heterogeneous.     Right ovary measures 3.1 x 1.3 x 1.8 cm, RI 0.43.     Left ovary measures 2.1 x 1.5 x 1.5 cm, RI 0.46.     There is no free fluid.     There is a complex right ovarian lesion measuring 2.3 cm.     Impression:     No acute process seen.     Heterogeneous uterus which can be an indicator of small fibroids or adenomyosis.  MRI could be helpful   Complex right ovarian lesion is most likely a involuting follicle.    Contraception: None  Pap:  NILM/HPV(-)  Mammogram:  BIRADS1, T-C=5%    Exam   /74   Ht 5' 4" (1.626 m)   Wt 72 kg (158 lb 11.7 oz)   LMP 2024   BMI 27.25 kg/m²     Physical Exam  Constitutional:       General: She is not in acute distress.     Appearance: Normal appearance. She is normal weight. She is not ill-appearing.   HENT:      Head: Normocephalic and atraumatic.   Pulmonary:      Effort: Pulmonary effort is normal.   Musculoskeletal:         General: Normal range of motion.   Neurological:      General: No focal deficit present.      Mental Status: She is alert and oriented to person, place, and time.   Psychiatric:         Mood and Affect: Mood normal.         Behavior: Behavior normal.         Thought Content: " Thought content normal.         Judgment: Judgment normal.   Vitals reviewed.       Assessment and Plan   Abnormal uterine bleeding (AUB)      Reviewed US images with patient. Likely intramural fibroid. Patient states her bleeding isn't that bad recently and she'd like to not proceed with medication or surgery at this time. Patient to let us know if anything changes and she'd like to proceed with further management.

## 2025-07-15 ENCOUNTER — PATIENT MESSAGE (OUTPATIENT)
Dept: PRIMARY CARE CLINIC | Facility: CLINIC | Age: 45
End: 2025-07-15
Payer: MEDICAID